# Patient Record
Sex: FEMALE | Employment: UNEMPLOYED | URBAN - METROPOLITAN AREA
[De-identification: names, ages, dates, MRNs, and addresses within clinical notes are randomized per-mention and may not be internally consistent; named-entity substitution may affect disease eponyms.]

---

## 2018-05-10 ENCOUNTER — DOCUMENTATION ONLY (OUTPATIENT)
Dept: SLEEP MEDICINE | Age: 63
End: 2018-05-10

## 2018-07-25 ENCOUNTER — OFFICE VISIT (OUTPATIENT)
Dept: SLEEP MEDICINE | Age: 63
End: 2018-07-25

## 2018-07-25 VITALS
OXYGEN SATURATION: 95 % | HEIGHT: 62 IN | SYSTOLIC BLOOD PRESSURE: 131 MMHG | BODY MASS INDEX: 28.52 KG/M2 | HEART RATE: 77 BPM | DIASTOLIC BLOOD PRESSURE: 74 MMHG | WEIGHT: 155 LBS

## 2018-07-25 DIAGNOSIS — G47.33 OSA (OBSTRUCTIVE SLEEP APNEA): Primary | ICD-10-CM

## 2018-09-04 ENCOUNTER — TELEPHONE (OUTPATIENT)
Dept: SLEEP MEDICINE | Age: 63
End: 2018-09-04

## 2018-09-04 DIAGNOSIS — G47.33 OBSTRUCTIVE SLEEP APNEA (ADULT) (PEDIATRIC): Primary | ICD-10-CM

## 2018-09-04 NOTE — PROGRESS NOTES
[address] [facility] [place]. [place], [address]  Tel.  [phone]  Fax. [address] [facility] 60  [place], [address] [facility]  Tel.  [phone]  Fax. [phone] [address] [facility] Carolyn Gomez [address]  Tel.  [phone]  Fax. [phone]       Chief Complaint       Chief Complaint   Patient presents with    Sleep Problem     NP ref Dr Lamin Crystal has SALO could not wear the device needs options       HPI      Jaden Escobedo is a  58y.o. year old female seen for evaluation of a sleep disorder  . 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 The patient reports [facility] and diagnosed with sleep apnea. Copies of that evaluation are not available. She was started on CPAP which she reports she was unable to tolerate. She reported difficulty in breathing using the CPAP mask. Specific mass type was not reported. She reported dry mouth, difficulties with sleep maintenance. Gary Sleepiness Score: 5       No Known Allergies    Current Outpatient Prescriptions   Medication Sig Dispense Refill    amLODIPine (NORVASC) 5 mg tablet Take 5 mg by mouth daily.  aspirin delayed-release 81 mg tablet Take  by mouth daily.  cholecalciferol, vitamin D3, (VITAMIN D3) 2,000 unit tab Take  by mouth.  biotin 2,500 mcg tab Take 500 Units/day by mouth two (2) times a day.  oxyCODONE-acetaminophen (PERCOCET) 5-325 mg per tablet 1 to 2 tablets every 4 hours as needed for pain 30 Tab 0    WHEAT DEXTRIN/L. ACID/ASPARTAME (FIBER WITH PROBIOTIC PO) Take 1 Tab by mouth daily.  multivitamin (ONE A DAY) tablet Take 1 Tab by mouth daily. She  has a past medical history of Blindness; Blurred vision; Constipation; Hair loss; and Hypertension. She  has a past surgical history that includes hx other surgical (2009) and hx other surgical (1985).     She family history includes Cancer in her mother; Hypertension in her mother and sister. She  reports that she has never smoked. She has never used smokeless tobacco. She reports that she does not drink alcohol or use illicit drugs. Review of Systems:  Review of Systems   Constitutional: Negative for chills and fever. HENT: Negative for hearing loss and tinnitus. Eyes: Positive for blurred vision. Negative for double vision. Respiratory: Negative for cough and wheezing. Cardiovascular: Positive for palpitations and leg swelling. Negative for chest pain. Gastrointestinal: Positive for abdominal pain. Negative for heartburn. Musculoskeletal: Positive for joint pain. Skin: Negative for itching and rash. Neurological: Positive for dizziness, tingling and tremors. Negative for headaches. Psychiatric/Behavioral: Positive for hallucinations and memory loss. Objective:     Visit Vitals    /74    Pulse 77    Ht 5' 2\" (1.575 m)    Wt 155 lb (70.3 kg)    SpO2 95%    BMI 28.35 kg/m2     Body mass index is 28.35 kg/(m^2). General:   Follows commands via    Eyes:  Pupils equal and reactive, no nystagmus,  flat disk margins. Normal A/V ratio   Oropharynx:   Mallampati score I, tongue normal   Tonsils:      Neck:   No carotid bruits; Chest/Lungs:  Clear on auscultation    CVS:  Normal rate, regular rhythm   Skin:  Warm to touch; no obvious rashes   Neuro:  Speech fluent, face symmetrical, tongue movement normal   Psych:  Normal affect,  normal countenance        Assessment:       ICD-10-CM ICD-9-CM    1. SALO (obstructive sleep apnea) G47.33 327.23      History of sleep apnea. Her previous provider's office will be contacted. HSAT will be obtained if prior records are unavailable. Plan:     No orders of the defined types were placed in this encounter. * Patient has a history and examination consistent with the diagnosis of sleep apnea.         Instructions:  o Do not engage in activities requiring a normal degree of alertness if fatigue is present. o The patient understands that untreated or undertreated sleep apnea could impair judgement and the ability to function normally during the day.  o Call or return if symptoms worsen or persist.          Lo Hodges MD, [facility]  Electronically signed 09/04/18       This note was created using voice recognition software. Despite editing, there may be syntax errors. This note will not be viewable in [address]

## 2018-09-04 NOTE — PATIENT INSTRUCTIONS
Sleep Apnea: Care Instructions  Your Care Instructions    Sleep apnea means that you frequently stop breathing for 10 seconds or longer during sleep. It can be mild to severe, based on the number of times an hour that you stop breathing or have slowed breathing. Blocked or narrowed airways in your nose, mouth, or throat can cause sleep apnea. Your airway can become blocked when your throat muscles and tongue relax during sleep. You can treat sleep apnea at home by making lifestyle changes. You also can use a CPAP breathing machine that keeps tissues in the throat from blocking your airway. Or your doctor may suggest that you use a breathing device while you sleep. It helps keep your airway open. This could be a device that you put in your mouth. Other examples include strips or disks that you use on your nose. In some cases, surgery may be needed to remove enlarged tissues in the throat. Follow-up care is a key part of your treatment and safety. Be sure to make and go to all appointments, and call your doctor if you are having problems. It's also a good idea to know your test results and keep a list of the medicines you take. How can you care for yourself at home? · Lose weight, if needed. It may reduce the number of times you stop breathing or have slowed breathing. · Sleep on your side. It may stop mild apnea. If you tend to roll onto your back, sew a pocket in the back of your pajama top. Put a tennis ball into the pocket, and stitch the pocket shut. This will help keep you from sleeping on your back. · Avoid alcohol and medicines such as sleeping pills and sedatives before bed. · Do not smoke. Smoking can make sleep apnea worse. If you need help quitting, talk to your doctor about stop-smoking programs and medicines. These can increase your chances of quitting for good. · Prop up the head of your bed 4 to 6 inches by putting bricks under the legs of the bed.   · Treat breathing problems, such as a stuffy nose, caused by a cold or allergies. · Try a continuous positive airway pressure (CPAP) breathing machine if your doctor recommends it. The machine keeps your airway open when you sleep. · If CPAP does not work for you, ask your doctor if you can try other breathing machines. A bilevel positive airway pressure machine uses one type of air pressure for breathing in and another type for breathing out. Another device raises or lowers air pressure as needed while you breathe. · Talk to your doctor if:  ¨ Your nose feels dry or bleeds when you use one of these machines. You may need to increase moisture in the air. A humidifier may help. ¨ Your nose is runny or stuffy from using a breathing machine. Decongestants or a corticosteroid nasal spray may help. ¨ You are sleepy during the day and it gets in the way of the normal things you do. Do not drive when you are drowsy. When should you call for help? Watch closely for changes in your health, and be sure to contact your doctor if:    · You still have sleep apnea even though you have made lifestyle changes.     · You are thinking of trying a device such as CPAP.     · You are having problems using a CPAP or similar machine. Where can you learn more? Go to http://nettie-marshall.info/. Enter H863 in the search box to learn more about \"Sleep Apnea: Care Instructions. \"  Current as of: December 6, 2017  Content Version: 11.7  © 7906-1741 [employer] Care instructions adapted under license by [employer] (which disclaims liability or warranty for this information). If you have questions about a medical condition or this instruction, always ask your healthcare professional. [place] [address] any warranty or liability for your use of this information.

## 2018-11-30 NOTE — PERIOP NOTES
[address]  Endoscopy Preprocedure Instructions      1. On the day of your surgery, please report to registration located on the 2nd floor of the  [facility]. yes    2. You must have a responsible adult to drive you to the hospital, stay at the hospital during your procedure and drive you home. If they leave your procedure will not be started (no exceptions). yes    3. Do not have anything to eat or drink (including water, gum, mints, coffee, and juice) after midnight. This does not apply to the medications you were instructed to take by your physician. yesIf you are currently taking Plavix, Coumadin, Aspirin, or other blood-thinning agents, contact your physician for special instructions. not applicable    4. If you are having a procedure that requires bowel prep: We recommend that you have only clear liquids the day before your procedure and begin your bowel prep by 5:00 pm.  You may continue to drink clear liquids until midnight. If for any reason you are not able to complete your prep please notify your physician immediately. yes    5. Have a list of all current medications, including vitamins, herbal supplements and any other over the counter medications. yes    6. If you wear glasses, contacts, dentures and/or hearing aids, they may be removed prior to procedure, please bring a case to store them in. yes    7. You should understand that if you do not follow these instructions your procedure may be cancelled. If your physical condition changes (I.e. fever, cold or flu) please contact your doctor as soon as possible. 8. It is important that you be on time.   If for any reason you are unable to keep your appointment please call [phone] the day of or your physicians office prior to your scheduled procedure

## 2018-12-05 ENCOUNTER — ANESTHESIA EVENT (OUTPATIENT)
Dept: ENDOSCOPY | Age: 63
End: 2018-12-05
Payer: MEDICAID

## 2018-12-05 ENCOUNTER — HOSPITAL ENCOUNTER (OUTPATIENT)
Age: 63
Setting detail: OUTPATIENT SURGERY
Discharge: HOME | End: 2018-12-05
Attending: INTERNAL MEDICINE | Admitting: INTERNAL MEDICINE
Payer: MEDICAID

## 2018-12-05 ENCOUNTER — ANESTHESIA (OUTPATIENT)
Dept: ENDOSCOPY | Age: 63
End: 2018-12-05
Payer: MEDICAID

## 2018-12-05 VITALS
HEIGHT: 62 IN | SYSTOLIC BLOOD PRESSURE: 120 MMHG | TEMPERATURE: 98.3 F | BODY MASS INDEX: 28.52 KG/M2 | OXYGEN SATURATION: 100 % | DIASTOLIC BLOOD PRESSURE: 48 MMHG | HEART RATE: 58 BPM | RESPIRATION RATE: 17 BRPM | WEIGHT: 155 LBS

## 2018-12-05 PROCEDURE — 74011250636 HC RX REV CODE- 250/636

## 2018-12-05 PROCEDURE — 76040000019: Performed by: INTERNAL MEDICINE

## 2018-12-05 PROCEDURE — 88305 TISSUE EXAM BY PATHOLOGIST: CPT

## 2018-12-05 PROCEDURE — 77030013992 HC SNR POLYP ENDOSC BSC -B: Performed by: INTERNAL MEDICINE

## 2018-12-05 PROCEDURE — 74011250636 HC RX REV CODE- 250/636: Performed by: INTERNAL MEDICINE

## 2018-12-05 PROCEDURE — 74011000258 HC RX REV CODE- 258

## 2018-12-05 PROCEDURE — 76060000031 HC ANESTHESIA FIRST 0.5 HR: Performed by: INTERNAL MEDICINE

## 2018-12-05 RX ORDER — DEXTROMETHORPHAN/PSEUDOEPHED 2.5-7.5/.8
1.2 DROPS ORAL
Status: DISCONTINUED | OUTPATIENT
Start: 2018-12-05 | End: 2018-12-05 | Stop reason: HOSPADM

## 2018-12-05 RX ORDER — FLUMAZENIL 0.1 MG/ML
0.2 INJECTION INTRAVENOUS
Status: DISCONTINUED | OUTPATIENT
Start: 2018-12-05 | End: 2018-12-05 | Stop reason: HOSPADM

## 2018-12-05 RX ORDER — MIDAZOLAM HYDROCHLORIDE 1 MG/ML
.25-5 INJECTION, SOLUTION INTRAMUSCULAR; INTRAVENOUS
Status: DISCONTINUED | OUTPATIENT
Start: 2018-12-05 | End: 2018-12-05 | Stop reason: HOSPADM

## 2018-12-05 RX ORDER — SODIUM CHLORIDE 9 MG/ML
50 INJECTION, SOLUTION INTRAVENOUS CONTINUOUS
Status: DISCONTINUED | OUTPATIENT
Start: 2018-12-05 | End: 2018-12-05 | Stop reason: HOSPADM

## 2018-12-05 RX ORDER — LIDOCAINE HYDROCHLORIDE 20 MG/ML
INJECTION, SOLUTION EPIDURAL; INFILTRATION; INTRACAUDAL; PERINEURAL AS NEEDED
Status: DISCONTINUED | OUTPATIENT
Start: 2018-12-05 | End: 2018-12-05 | Stop reason: HOSPADM

## 2018-12-05 RX ORDER — EPINEPHRINE 0.1 MG/ML
1 INJECTION INTRACARDIAC; INTRAVENOUS
Status: DISCONTINUED | OUTPATIENT
Start: 2018-12-05 | End: 2018-12-05 | Stop reason: HOSPADM

## 2018-12-05 RX ORDER — PROPOFOL 10 MG/ML
INJECTION, EMULSION INTRAVENOUS AS NEEDED
Status: DISCONTINUED | OUTPATIENT
Start: 2018-12-05 | End: 2018-12-05 | Stop reason: HOSPADM

## 2018-12-05 RX ORDER — PROPOFOL 10 MG/ML
INJECTION, EMULSION INTRAVENOUS
Status: DISCONTINUED | OUTPATIENT
Start: 2018-12-05 | End: 2018-12-05 | Stop reason: HOSPADM

## 2018-12-05 RX ORDER — ATROPINE SULFATE 0.1 MG/ML
0.5 INJECTION INTRAVENOUS
Status: DISCONTINUED | OUTPATIENT
Start: 2018-12-05 | End: 2018-12-05 | Stop reason: HOSPADM

## 2018-12-05 RX ORDER — NALOXONE HYDROCHLORIDE 0.4 MG/ML
0.4 INJECTION, SOLUTION INTRAMUSCULAR; INTRAVENOUS; SUBCUTANEOUS
Status: DISCONTINUED | OUTPATIENT
Start: 2018-12-05 | End: 2018-12-05 | Stop reason: HOSPADM

## 2018-12-05 RX ADMIN — PROPOFOL 80 MG: 10 INJECTION, EMULSION INTRAVENOUS at 07:32

## 2018-12-05 RX ADMIN — SODIUM CHLORIDE 50 ML/HR: 900 INJECTION, SOLUTION INTRAVENOUS at 07:26

## 2018-12-05 RX ADMIN — PROPOFOL 140 MCG/KG/MIN: 10 INJECTION, EMULSION INTRAVENOUS at 07:32

## 2018-12-05 RX ADMIN — LIDOCAINE HYDROCHLORIDE 40 MG: 20 INJECTION, SOLUTION EPIDURAL; INFILTRATION; INTRACAUDAL; PERINEURAL at 07:32

## 2018-12-05 NOTE — PROCEDURES
[address] [facility] [address]  [address] [facility], [address] [facility]  [phone]                   Colonoscopy Operative Report      Indications:    Constipation     :  Priya Tucker MD    Referring Provider: Rodo Perez MD    Sedation:  MAC anesthesia Propofol    Procedure Details:  After informed consent was obtained with all risks and benefits of procedure explained and preoperative exam completed, the patient was taken to the endoscopy suite and placed in the left lateral decubitus position. Upon sequential sedation as per above, a digital rectal exam was performed  And was normal.  The Olympus videocolonoscope  was inserted in the rectum and carefully advanced to the cecum, which was identified by the ileocecal valve and appendiceal orifice, terminal ileum. The quality of preparation was good. The colonoscope was slowly withdrawn with careful evaluation between folds. Retroflexion in the rectum was performed and was normal..     Findings:   Rectum: normal  Sigmoid: normal  Descending Colon: 1  Sessile polyp(s), the largest 6 mm in size;  Transverse Colon: normal  Ascending Colon: normal  Cecum: normal  Terminal Ileum: normal    Interventions:  1 complete polypectomy were performed using cold snare  and the polyps were  retrieved    Specimen Removed:  specimen #1, 7 mm in size, located in the descending colon removed by cold snare and retrieved for pathology    Complications: None. EBL:  None. Impression: Colon polyp (removed)    Recommendations:   -Await pathology. -If adenoma is present, repeat colonoscopy in 5 years.  -High fiber diet.  -Constipation education  -Follow up with primary care physician.     -Resume normal medication(s). Discharge Disposition:  Home in the company of a  when able to ambulate.     Priya Tucker MD  12/5/2018  7:50 AM

## 2018-12-05 NOTE — H&P
[address]  [address] [facility], [address] [facility]  [phone]                     History and Physical     NAME: Liliane Marquez   :  1955   MRN:  [id number]     HPI:   58year old female who presents with a complaint of Constipation. Reason for encounter: consultation at the request of Dr. Flores Clayton). The onset of the constipation has been chronic and has been occurring in a persistent pattern for years. The course has been increasing. The frequency of bowel movements has been 3 per week. The stools are hard stools, ribbon-like stools, rabbit pellet stools (scybalous) and sense of incomplete evacuation (>25 percent of time) and  the amount of stool per bowel movement is small .  The symptoms have been associated with abdominal distention and history of abdominal surgery (CHLOÉ and cystocele), while the symptoms have not been associated with abdominal pain, bladder disturbances, bleeding per rectum, depression, family history of colon cancer, hemorrhoids, vomiting or family history of colon polyps. The constipation is characterized as partially controlled (hihg fiber intake). The patient denies the use of fiber supplementation, laxatives or herbal supplements. Previous diagnostic tests have not included colonoscopy. Note for \"Constipation\": pt has h/o cystocele sx 10 yrs ago.      Past Surgical History:   Procedure Laterality Date    HX CATARACT REMOVAL Bilateral     HX COLONOSCOPY      HX HYSTERECTOMY  2009    Not sure if she has her ovaries, patient states    HX OTHER SURGICAL  1985    tubiligation    HX OTHER SURGICAL Bilateral     eyes     Past Medical History:   Diagnosis Date    Adverse effect of anesthesia     During open dental work-local anesthesia- increase in heart rate     Blindness     retina issues- legally blind (Bilat. eyes)    Blurred vision     Constipation     Hair loss     Heart murmur     Hypertension      Social History     Tobacco Use    Smoking status: Never Smoker    Smokeless tobacco: Never Used   Substance Use Topics    Alcohol use: Yes     Alcohol/week: 0.6 oz     Types: 1 Glasses of wine per week     Comment: occ    Drug use: No     No Known Allergies  Family History   Problem Relation Age of Onset    Cancer Mother         uterine    Hypertension Mother     Hypertension Sister      No current facility-administered medications for this encounter. PHYSICAL EXAM:  General: WD, WN. Alert, cooperative, no acute distress    HEENT: NC, Atraumatic. PERRLA, EOMI. Anicteric sclerae. Lungs:  CTA Bilaterally. No Wheezing/Rhonchi/Rales. Heart:  Regular  rhythm,  No murmur, No Rubs, No Gallops  Abdomen: Soft, Non distended, Non tender.  +Bowel sounds, no HSM  Extremities: No c/c/e  Neurologic:  CN 2-12 gi, Alert and oriented X 3. No acute neurological distress   Psych:   Good insight. Not anxious nor agitated. Assessment:   I have reviewed with the patient +/- family alternatives,benefits and risks for the procedure, as well as potential complications(with emphasis on, but not limited to, bleeding, perforation, cardiovascular/cerebrovascular/pulmonary events, reactions to the medications, infection, risk of missing a lesions/a cancer, and the imponderables including death), alternative options, and patient/family voices understanding.       Plan:   · Endoscopic procedure  · Conscious sedation or MAC

## 2018-12-05 NOTE — ANESTHESIA POSTPROCEDURE EVALUATION
Procedure(s): 
COLONOSCOPY 
ENDOSCOPIC POLYPECTOMY. Anesthesia Post Evaluation Patient location during evaluation: PACU Level of consciousness: awake Pain management: adequate Airway patency: patent Anesthetic complications: no 
Cardiovascular status: acceptable Respiratory status: acceptable Hydration status: acceptable Visit Vitals /47 Pulse 60 Temp 36.8 °C (98.3 °F) Resp 16 Ht 5' 2\" (1.575 m) Wt 70.3 kg (155 lb) SpO2 99% Breastfeeding? No  
BMI 28.35 kg/m²

## 2018-12-05 NOTE — ROUTINE PROCESS
Nida Downsmor  1955  [id number]    Situation:  Verbal report received from: Juanita Plummer RN  Procedure: Procedure(s):  COLONOSCOPY  ENDOSCOPIC POLYPECTOMY    Background:    Preoperative diagnosis: CONSTIPATION  Postoperative diagnosis: Descending colon polyp    :  Dr. Marquis Jurado  Assistant(s): Endoscopy RN-1: Juanita Ludwig RN; Jessica Santacruz RN    Specimens:   ID Type Source Tests Collected by Time Destination   1 : Descending colon polyp Preservative   Augustina Turner MD 12/5/2018 0117 Pathology     H. Pylori  no    Assessment:  Intra-procedure medications     Anesthesia gave intra-procedure sedation and medications, see anesthesia flow sheet yes    Intravenous fluids: NS@ KVO     Vital signs stable     Abdominal assessment: round and soft     Recommendation:  Discharge patient per MD order  Family or Friend   Permission to share finding with family or friend yes    Endoscopy discharge instructions have been reviewed and given to patient and son. The patient and son verbalized understanding and acceptance of instructions.

## 2018-12-05 NOTE — PERIOP NOTES
Procedure being performed under MAC; Kodak Padgett., CRNA at bedside monitoring patient at [facility]. See anesthesia notes. Endoscope was pre-cleaned at bedside immediately following procedure by Ry Newby RN Tech. at [phone]. Care of patient assumed from the anesthesia provider at . Patient tolerated procedure well. Abdomen remains soft and non tender post procedure, no complaints or indication of discomfort noted at this time. See anesthesia note. Patient transferred to Endoscopy Recovery and report given to recovery nurse Chantelle Burris. recovery room MIRELLA.

## 2018-12-05 NOTE — ANESTHESIA PREPROCEDURE EVALUATION
Anesthetic History No history of anesthetic complications Review of Systems / Medical History Patient summary reviewed, nursing notes reviewed and pertinent labs reviewed Pulmonary Within defined limits Neuro/Psych Within defined limits Cardiovascular Hypertension: well controlled Exercise tolerance: >4 METS 
  
GI/Hepatic/Renal 
Within defined limits Endo/Other Within defined limits Other Findings Physical Exam 
 
Airway Mallampati: II 
TM Distance: 4 - 6 cm Neck ROM: normal range of motion Mouth opening: Normal 
 
 Cardiovascular Regular rate and rhythm,  S1 and S2 normal,  no murmur, click, rub, or gallop Rhythm: regular Rate: normal 
 
 
 
 Dental 
No notable dental hx Pulmonary Breath sounds clear to auscultation Abdominal 
GI exam deferred Other Findings Anesthetic Plan ASA: 2 Anesthesia type: MAC Induction: Intravenous Anesthetic plan and risks discussed with: Patient

## 2018-12-05 NOTE — DISCHARGE INSTRUCTIONS
[address] [facility]  [address], [address] [facility]  [phone]                   Justice   [id number]  1955    COLON DISCHARGE INSTRUCTIONS    DISCOMFORT:  Redness at IV site- apply warm compress to area; if redness or soreness persist- contact your physician  There may be a slight amount of blood passed from the rectum  Gaseous discomfort- walking, belching will help relieve any discomfort  You may not operate a vehicle for 12 hours  You may not  engage in an occupation involving machinery or appliances for rest of today  You may not  drink alcoholic beverages for at least 12 hours  Avoid making any critical decisions for at least 24 hour    DIET:   High fiber diet. - however -  remember your colon is empty and a heavy meal will produce gas. Avoid these foods:  vegetables, fried / greasy foods, carbonated drinks for today     ACTIVITY:  It is recommended that you spend the remainder of the day resting -  avoid any strenuous activity. CALL M.D. ANY SIGN OF:   Increasing pain, nausea, vomiting  Abdominal distension (swelling)  New increased bleeding (oral or rectal)  Fever (chills)  Pain in chest area  Bloody discharge from nose or mouth  Shortness of breath    You may resume your medications    Post procedure diagnosis:  Descending colon polyp    Follow-up Instructions:    If a specimen was collected, you will receive a letter with the result by mail within two  weeks. Depending on the result this letter will specify your follow up colonoscopy date.       Please call us for any questions or concerns                     Justice Alejandro  [id number]  1955        DISCHARGE SUMMARY from Nurse    The following personal items collected during your admission are returned to you:   Dental Appliance: Dental Appliances: None  Vision: Visual Aid: None  Hearing Aid:    Jewelry:    Clothing:    Other Valuables:    Valuables sent to safe:            Constipation: Care Instructions  Your Care Instructions    Constipation means that you have a hard time passing stools (bowel movements). People pass stools from 3 times a day to once every 3 days. What is normal for you may be different. Constipation may occur with pain in the rectum and cramping. The pain may get worse when you try to pass stools. Sometimes there are small amounts of bright red blood on toilet paper or the surface of stools. This is because of enlarged veins near the rectum (hemorrhoids). A few changes in your diet and lifestyle may help you avoid ongoing constipation. Your doctor may also prescribe medicine to help loosen your stool. Some medicines can cause constipation. These include pain medicines and antidepressants. Tell your doctor about all the medicines you take. Your doctor may want to make a medicine change to ease your symptoms. Follow-up care is a key part of your treatment and safety. Be sure to make and go to all appointments, and call your doctor if you are having problems. It's also a good idea to know your test results and keep a list of the medicines you take. How can you care for yourself at home? · Drink plenty of fluids, enough so that your urine is light yellow or clear like water. If you have kidney, heart, or liver disease and have to limit fluids, talk with your doctor before you increase the amount of fluids you drink. · Include high-fiber foods in your diet each day. These include fruits, vegetables, beans, and whole grains. · Get at least 30 minutes of exercise on most days of the week. Walking is a good choice. You also may want to do other activities, such as running, swimming, cycling, or playing tennis or team sports. · Take a fiber supplement, such as Citrucel or Metamucil, every day. Read and follow all instructions on the label. · Schedule time each day for a bowel movement. A daily routine may help. Take your time having your bowel movement.   · Support your feet with a small step stool when you sit on the toilet. This helps flex your hips and places your pelvis in a squatting position. · Your doctor may recommend an over-the-counter laxative to relieve your constipation. Examples are Milk of Magnesia and MiraLax. Read and follow all instructions on the label. Do not use laxatives on a long-term basis. When should you call for help? Call your doctor now or seek immediate medical care if:    · You have new or worse belly pain.     · You have new or worse nausea or vomiting.     · You have blood in your stools.    Watch closely for changes in your health, and be sure to contact your doctor if:    · Your constipation is getting worse.     · You do not get better as expected. Where can you learn more? Go to http://nettie-marshall.info/. Enter 21 [phone] in the search box to learn more about \"Constipation: Care Instructions. \"  Current as of: November 20, 2017  Content Version: 11.8  © 1768-4491 [employer]. Care instructions adapted under license by [employer] (which disclaims liability or warranty for this information). If you have questions about a medical condition or this instruction, always ask your healthcare professional. [place] [address] any warranty or liability for your use of this information. Dieta charlotte en fibra: Instrucciones de cuidado - [ High-Fiber Diet: Care Instructions ]  Instrucciones de cuidado    [facility] dieta con alto contenido de fibra puede ayudarle a aliviar el estreñimiento y a sentirse menos abotagado. Pickens médico y pickens dietista le ayudarán a crear un plan de alimentación con alto contenido de fibra basándose en radha necesidades personales. El plan incluirá las cosas que le gusta comer. También se asegurarán de que obtenga 30 gramos de fibra al día. Antes de hacer cambios en la [facility] come, asegúrese de hablar con pickens médico o pickens dietista.   [place] de seguimiento es caden parte clave de pickens tratamiento y seguridad. Asegúrese de hacer y acudir a todas las citas, y llame a pickens médico si está teniendo problemas. También es caden buena idea saber los resultados de radha exámenes y mantener caedn lista de los medicamentos que rosana. ¿Cómo puede cuidarse en el hogar? · Usted puede aumentar la cantidad de fibra que ingiere si come más de ciertos alimentos. Estos alimentos incluyen:  ? Panes y cereales integrales. ? Frutas, rylan peras, manzanas y duraznos (melocotones). Coma la piel, la cáscara y las semillas si puede. ? Verduras, rylan brócoli, repollo, espinacas, zanahorias, espárragos y calabaza. ? Verduras con almidón. Estas incluyen las minesh con cáscara, los frijoles rojos y los frijoles de lima. · [place] un suplemento de [employer] días si pickens médico lo recomienda. Los ejemplos son Benefiber, Citrucel, FiberCon y Metamucil. Pregunte a pickens médico qué cantidad debe mili. · Nell abundantes líquidos, los suficientes rylan para que pickens orina sea de color amarillo mimi o transparente rylan el agua. Si tiene [employer], del corazón o del hígado y tiene que Krista's líquidos, hable con pickens médico antes de aumentar pickens consumo. · Juan C algo de ejercicio todos los young. El ejercicio ayuda a que las heces se desplacen por el colon. También ayuda a prevenir el estreñimiento. · Lleve un diario de alimentos. Trate de observar y anotar qué alimentos le provocan gas, dolor u otros síntomas. De mark modo, puede evitar esos alimentos. ¿Dónde puede encontrar más información en inglés? Thania Sang a http://nettie-marshall.info/. Concepcion Peck B337 en la búsqueda para aprender más acerca de \"Dieta charlotte en fibra: Instrucciones de cuidado - [ High-Fiber Diet: Care Instructions ]. \"  Revisado: [address], 2018  Versión del contenido: 11.8  © 9039-8531 Healthwise, [employer].  Las instrucciones de cuidado fueron adaptadas bajo licencia por [employer] Help Connections (which disclaims liability or warranty for this information). Si usted tiene [place] afección médica o sobre estas instrucciones, siempre pregunte a pickens profesional de hilaria. [facility] niega toda garantía o responsabilidad por pickens uso de esta información.

## 2020-01-21 ENCOUNTER — OFFICE VISIT (OUTPATIENT)
Dept: NEUROLOGY | Age: 65
End: 2020-01-21

## 2020-01-21 VITALS
HEART RATE: 71 BPM | OXYGEN SATURATION: 98 % | WEIGHT: 153.5 LBS | DIASTOLIC BLOOD PRESSURE: 80 MMHG | SYSTOLIC BLOOD PRESSURE: 118 MMHG | RESPIRATION RATE: 20 BRPM | HEIGHT: 62 IN | BODY MASS INDEX: 28.25 KG/M2

## 2020-01-21 DIAGNOSIS — G25.0 BENIGN ESSENTIAL TREMOR: Primary | ICD-10-CM

## 2020-01-21 RX ORDER — PRIMIDONE 50 MG/1
TABLET ORAL
Qty: 90 TAB | Refills: 1 | Status: SHIPPED | OUTPATIENT
Start: 2020-01-21 | End: 2020-09-23 | Stop reason: SDUPTHER

## 2020-01-21 NOTE — LETTER
1/21/20 Patient: Cata Grove YOB: 1955 Date of Visit: 1/21/2020 Nahid Oconnor MD 
[address] [place] [address] VIA Facsimile: [phone] Dear Nahid Oconnor MD, Thank you for referring Ms. Cata Grove to [facility] for evaluation. My notes for this consultation are attached. If you have questions, please do not hesitate to call me. I look forward to following your patient along with you. Sincerely, Aleyda Flores MD

## 2020-01-21 NOTE — PATIENT INSTRUCTIONS
Temblor esencial williams: Instrucciones de cuidado - [ Benign Essential Tremor: Care Instructions ] Instrucciones de cuidado Temblor esencial williams es un término médico para un temblor que usted no puede controlar. [place] vez le tiemblen la mano o los dedos cuando levanta caden taza o señala algo. O quizás le tiemble la voz al Eloise [employer] Neeraj. Chayito tipo de temblor no es dañino. Pickens causa no es un ataque cerebral ni la enfermedad de Parkinson. Ciertas cosas pueden tener un efecto en cuánto tiembla. Por ejemplo, beber o comer algo con cafeína puede empeorar los temblores de forma temporal. Algunos medicamentos también pueden aumentar los temblores. Estos incluyen los antidepresivos y demasiados sustitutos de la hormona tiroidea. Hable con pickens médico si pacheco que alguno de radha medicamentos empeora radha temblores. Si los temblores lo avergüenzan, existen algunas cosas que usted puede hacer para reducirlos o hacer que keily menos evidentes. [place] incluye mili medicamentos. La atención de seguimiento es caden parte clave de pickens tratamiento y seguridad. Asegúrese de hacer y acudir a todas las citas, y llame a pickens médico si está teniendo problemas. También es caden buena idea saber los resultados de radha exámenes y mantener caden lista de los medicamentos que rosana. Cómo puede cuidarse en el hogar? · [place] los medicamentos exactamente según las indicaciones. Llame a pickens médico si pacheco estar teniendo un problema con pickens medicamento. Algunos medicamentos que ayudan a [employer] temblores se deben mili todos los días, incluso si no tiene temblores. Recibirá [employer] medicamentos específicos recetados por pickens médico. 
· Descanse lo suficiente. · Siga caden Lavona Damme y saludable. · Intente reducir el estrés. El ejercicio y los masajes regulares le podrían ayudar. · Limite el alcohol. Beber en exceso puede [employer] temblores.  
· Evite las bebidas o alimentos con cafeína si hacen que empeoren radha temblores. Se incluyen el té, las bebidas de cola, el café y el chocolate. · Lleve puesto un brazalete o un reloj pesado. [place] añade algo de [employer]. El peso adicional podría reducir los temblores. · Nell de tazas o vasos que solo estén a medio llenar. Puede convenirle tratar de beber con caden pajita (popote). Cuándo debe pedir ayuda? Preste especial atención a los cambios en pickens hilaria y asegúrese de comunicarse con pickens médico si: 
  · Nota que los temblores están empeorando.  
  · No puede hacer las actividades diarias por los temblores.  
  · Está jeferson y avergonzado por radha temblores. Dónde puede encontrar más información en inglés? Florence Brooks a http://nettie-marshall.info/. Escriba B746 en la búsqueda para aprender más acerca de \"Temblor esencial williams: Instrucciones de cuidado - [ Benign Essential Tremor: Care Instructions ]. \" 
Revisado: 28 marzo, 2019 Versión del contenido: 12.2 © 9566-6969 Healthwise, Incorporated. Las instrucciones de cuidado fueron adaptadas bajo licencia por [employer] Help Connections (which disclaims liability or warranty for this information). Si usted tiene [place] afección médica o sobre estas instrucciones, siempre pregunte a pickens profesional de hilaria. [place], [employer] niega toda garantía o responsabilidad por pickens uso de esta información.

## 2020-01-21 NOTE — PROGRESS NOTES
Hand tremors have gotten worse since she was last seen by the other neurologist but there was never a follow up there it has been over a year since she last saw them. She stated she has the tremors are in her hands but she does feel it go all the way up her arms (both of them).      No medications have been tried the only testing MRI of the brain was done   They determined everything was normal and they told her there was no follow up needed   MRI was at least a year ago

## 2020-01-21 NOTE — PROGRESS NOTES
NEUROLOGY CLINIC NOTE    Patient ID:  Chase Fallon  [id number]  23 y.o.  1955    Date of Consultation:  January 21, 2020    Referring Physician: Dr. Daniela Xie    Reason for Consultation:  Hand tremors    Chief Complaint   Patient presents with    New Patient     hand tremors        History of Present Illness: There are no active problems to display for this patient. Past Medical History:   Diagnosis Date    Adverse effect of anesthesia     During open dental work-local anesthesia- increase in heart rate     Blindness     retina issues- legally blind (Bilat. eyes)    Blurred vision     Constipation     Hair loss     Heart murmur     Hypertension       Past Surgical History:   Procedure Laterality Date    COLONOSCOPY N/A 12/5/2018    [facility] at [facility] HX CATARACT REMOVAL Bilateral     HX COLONOSCOPY      HX HYSTERECTOMY  2009    Not sure if she has her ovaries, patient states    HX [place]    tubiligation    HX OTHER SURGICAL Bilateral     eyes      Prior to Admission medications    Medication Sig Start Date End Date Taking? Authorizing Provider   amLODIPine (NORVASC) 5 mg tablet Take 5 mg by mouth daily. Yes Provider, Historical   cholecalciferol, vitamin D3, (VITAMIN D3) 2,000 unit tab Take  by mouth. Yes Provider, Historical   multivitamin (ONE A DAY) tablet Take 1 Tab by mouth daily. Yes Provider, Historical   WHEAT DEXTRIN/L. ACID/ASPARTAME (FIBER WITH PROBIOTIC PO) Take 1 Tab by mouth daily. Provider, Historical   aspirin delayed-release 81 mg tablet Take  by mouth daily. Provider, Historical   biotin 2,500 mcg tab Take 500 Units/day by mouth two (2) times a day. Provider, Historical     No Known Allergies   Social History     Tobacco Use    Smoking status: Never Smoker    Smokeless tobacco: Never Used   Substance Use Topics    Alcohol use:  Yes     Alcohol/week: 1.0 standard drinks     Types: 1 Glasses of wine per week     Comment: occ      Family History   Problem Relation Age of Onset    Cancer Mother         uterine    Hypertension Mother     Hypertension Sister         Subjective:      Sarah Garnett is a 59 y.o. RH female with history of bilateral retinal issues causing blindness, HTN, hyperlipidemia and SALO who was referred here by Dr. Nohemy Rossi for further evaluation of her tremors. Patient was seen by her PCP 12/23/2019. Note mentions patient having issues with hypertension and hyperlipidemia. Also has issues with retinal dystrophy. Patient wanted to see another neurologist in relation to her worsening tremors. Seen by Dr. Devin Mckenzie last June 2018 and there was a suggestion of parkinsonism. Patient had a brain MRI done and has had no follow-up. Patient was referred here for another opinion. 12/18/2019: CBC, CMP, hepatitis panel, HIV, vitamin D, TSH were normal. Lipid panel revealed slightly elevated cholesterol and LDL. Per patient condition started about 2 years PTC. Gradual onset. Main involving her left fingers. More prominent when she wakes up in the morning. Shakes when she is holding on to something with the left hand. No issues with the right hand. Drops things with the left hand. Tremors seems to be spreading up her whole left arm. Some tremors at rest but mainly with use. Went to be seen at [facility] more than 1 year PTC. Mentions having a brain MRI done and reported to be unremarkable. No follow up was made. Denies any weakness. No issues with walking or day to day activity except for limitation due to her vision issues. Outside reports reviewed: office notes.     Review of Systems:    A comprehensive review of systems was performed:   Constitutional: positive for none  Eyes: positive for none  Ears, nose, mouth, throat, and face: positive for snoring  Respiratory: positive for none  Cardiovascular: positive for skipped beats  Gastrointestinal: positive for none  Genitourinary: positive for none  Integument/breast: positive for none  Hematologic/lymphatic: positive for none  Musculoskeletal: positive for none  Neurological: positive for memory loss, tremors  Behavioral/Psych: positive for none  Endocrine: positive for none  Allergic/Immunologic: positive for none      Objective:     Visit Vitals  /80   Pulse 71   Resp 20   Ht 5' 2\" (1.575 m)   Wt 69.6 kg (153 lb 8 oz)   SpO2 98%   BMI 28.08 kg/m²       PHYSICAL EXAM:    General Appearance: Alert, patient appears stated age. General:  Overweight, patient in no apparent distress. Head/Face: The head is normocephalic and atraumatic. Eyes: Conjunctivae appear normal. Sclera appear normal and non-icteric. Nose (and Sinus):   No abnormality of the nose or sinuses is noted. Oral:   Throat is clear. Lymphatics:  No lymphadenopathy in the neck/head. Neck and Thyroid:   No bruits noted in the neck. Respiratory:  Lungs clear to auscultation. Cardiovascular:  Palpation and auscultation: regular rate and rhythm. Extremity: No joint swelling, erythema or pedal edema. NEUROLOGICAL EXAM:    Appearance: The patient is overweight, provides a coherent history and is in no acute distress. Mental Status: Oriented to time, place and person. Fluent, no aphasia or dysarthria. Mood and affect appropriate. Cranial Nerves:   Legally blind OU. SIRENA, EOM's full, no nystagmus, no ptosis. Facial sensation is normal. Corneal reflexes are intact. Facial movement is symmetric. Hearing is normal bilaterally. Palate is midline with normal elevation. Sternocleidomastoid and trapezius muscles are normal. Tongue is midline. Motor:  5/5 strength in upper and lower proximal and distal muscles. Normal bulk and tone. No fasciculations. No pronator drift. Reflexes:   Deep tendon reflexes 2+/4 and symmetrical. Downgoing toes. Sensory:   Normal to cold, pinprick and vibration. Gait:  Normal gait. No Romberg.  Can do tandem walking. Tremor:   Very mild intentional and postural L>R UE tremor noted. No resting pill rolling tremors seen. Cerebellar:  Intact FTN/QUITA/HTS. No bradykinesia. Neurovascular:  Normal heart sounds and regular rhythm, peripheral pulses intact, and no carotid bruits. Labs Reviewed      Assessment:   Essential tremors - worsening    Plan:   Neurological examination reveals baseline blindness in both eyes and very mild intentional and postural left greater than right upper extremity tremors. No findings typically seen in Parkinson's disease. Suspect issues more consistent with benign essential tremors. I need to obtain records from her previous neurologist as well as the brain MRI that was done. Discussed trial of medication versus monitoring. Patient opted to try medications. Trial of primidone 50 mg at bedtime initially and up to 50 mg 3 times daily if necessary. Prescriptions provided. Other options include beta-blockers, gabapentin, Topamax and benzodiazepine. Advised patient to do routine structured exercises. All questions and concerns were answered. Visit lasted 60 minutes. Given the 50% was spent reviewing her medical records as summarized above including recent laboratory work-up, discussion about her condition, potential etiology, prognosis, need to obtain medical records especially neuroimaging results, treatment options, medication    This note was created using voice recognition software. Despite editing, there may be syntax errors.

## 2020-09-23 ENCOUNTER — OFFICE VISIT (OUTPATIENT)
Dept: NEUROLOGY | Age: 65
End: 2020-09-23
Payer: MEDICAID

## 2020-09-23 VITALS
TEMPERATURE: 96.8 F | OXYGEN SATURATION: 98 % | HEART RATE: 66 BPM | RESPIRATION RATE: 20 BRPM | DIASTOLIC BLOOD PRESSURE: 70 MMHG | SYSTOLIC BLOOD PRESSURE: 130 MMHG

## 2020-09-23 DIAGNOSIS — G20 PARKINSONISM, UNSPECIFIED PARKINSONISM TYPE (HCC): ICD-10-CM

## 2020-09-23 DIAGNOSIS — G25.0 BENIGN ESSENTIAL TREMOR: Primary | ICD-10-CM

## 2020-09-23 PROCEDURE — 99214 OFFICE O/P EST MOD 30 MIN: CPT | Performed by: PSYCHIATRY & NEUROLOGY

## 2020-09-23 RX ORDER — PRIMIDONE 50 MG/1
TABLET ORAL
Qty: 90 TAB | Refills: 1 | Status: SHIPPED | OUTPATIENT
Start: 2020-09-23

## 2020-09-23 NOTE — LETTER
9/23/20 Patient: Aashish Munoz YOB: 1955 Date of Visit: 9/23/2020 Jimenez Luke MD 
[address] [place] [id number] VIA Facsimile: [phone] Dear Jimenez Luke MD, Thank you for referring Ms. Aashish Munoz to [facility] for evaluation. My notes for this consultation are attached. If you have questions, please do not hesitate to call me. I look forward to following your patient along with you. Sincerely, Allen Patel MD

## 2020-09-23 NOTE — PROGRESS NOTES
Patient opted to not take the primidone   She never started the medication     Tremors are worse in the left hand and she feels it is going down her body on the left side to her legs   Her hands shake even when at rest

## 2020-09-23 NOTE — PATIENT INSTRUCTIONS
Temblor esencial williams: Instrucciones de cuidado Benign Essential Tremor: Care Instructions Instrucciones de cuidado Temblor esencial williams es un término médico para un temblor que usted no puede controlar. [place] vez le tiemblen la mano o los dedos cuando levanta caden taza o señala algo. O quizás le tiemble la voz al [employer]. Chayito tipo de temblor no es dañino. Pick[place] causa no es un ataque cerebral ni la enfermedad de Parkinson. Ciertas cosas pueden tener un efecto en cuánto tiembla. Por ejemplo, beber o comer algo con cafeína puede empeorar los temblores de forma temporal. Algunos medicamentos también pueden aumentar los temblores. Estos incluyen los antidepresivos y demasiados sustitutos de la hormona tiroidea. Hable con pickens médico si pacheco que alguno de radha medicamentos empeora radha temblores. Si los temblores lo avergüenzan, existen algunas cosas que usted puede hacer para reducirlos o hacer que keily menos evidentes. [place] incluye mili medicamentos. La atención de seguimiento es caden parte clave de pickens tratamiento y seguridad. Asegúrese de hacer y acudir a todas las citas, y llame a pickens médico si está teniendo problemas. También es caden buena idea saber los resultados de radha exámenes y mantener caden lista de los medicamentos que rosana. Cómo puede cuidarse en el hogar? · [place] los medicamentos exactamente según las indicaciones. Llame a pickens médico si pacheco estar teniendo un problema con pickens medicamento. Algunos medicamentos que ayudan a [employer] temblores se deben mili todos los días, incluso si no tiene temblores. Recibirá [employer] medicamentos específicos recetados por pickens médico. 
· Descanse lo suficiente. · Siga caden [place] Dull y saludable. · Intente reducir el estrés. El ejercicio y los masajes regulares le podrían ayudar. · Limite el alcohol. Beber en exceso puede [employer] temblores.  
· Evite las bebidas o alimentos con cafeína si hacen que empeoren radha temblores. Se incluyen el té, las bebidas de cola, el café y el chocolate. · Lleve puesto un brazalete o un reloj pesado. [place] añade algo de [facility]. El peso adicional podría reducir los temblores. · Nell de tazas o vasos que solo estén a medio llenar. Puede convenirle tratar de beber con caden pajita (popote). Cuándo debe pedir ayuda? Preste especial atención a los cambios en pickens hilaria y asegúrese de comunicarse con pickens médico si: 
  · Nota que los temblores están empeorando.  
  · No puede hacer las actividades diarias por los temblores.  
  · Está jeferson y avergonzado por radha temblores. Dónde puede encontrar más información en inglés? Cony Ask a http://nettie-marshall.info/ [place] B746 en la búsqueda para aprender más acerca de \"Temblor esencial williams: Instrucciones de cuidado. \" Revisado: 20 noviembre, 2019               Versión del contenido: 12.6 © 5955-1395 Healthwise, Incorporated. Las instrucciones de cuidado fueron adaptadas bajo licencia por [employer]ctions (which disclaims liability or warranty for this information). Si kosta tiene [place] afección médica o sobre estas instrucciones, siempre pregunte a pickens profesional de hilaria. [employer] niega toda garantía o responsabilidad por pickens uso de esta información.

## 2020-09-23 NOTE — PROGRESS NOTES
NEUROLOGY CLINIC NOTE    Patient ID:  Jesus Donaldson  [id number]  5 y.o.  1955    Date of Visit:  September 23, 2020    Referring Physician: Dr. Noemi Wade    Reason for Visit:  Hand tremors    Chief Complaint   Patient presents with    Follow-up     hand tremors        History of Present Illness: There are no active problems to display for this patient. Past Medical History:   Diagnosis Date    Adverse effect of anesthesia     During open dental work-local anesthesia- increase in heart rate     Blindness     retina issues- legally blind (Bilat. eyes)    Blurred vision     Constipation     Hair loss     Heart murmur     Hypertension       Past Surgical History:   Procedure Laterality Date    COLONOSCOPY N/A 12/5/2018    COLONOSCOPY performed by Nevin Dos Santos MD at [facility] HX CATARACT REMOVAL Bilateral     HX COLONOSCOPY      HX HYSTERECTOMY  2009    Not sure if she has her ovaries, patient states    HX [place]    tubiligation    HX OTHER SURGICAL Bilateral     eyes      Prior to Admission medications    Medication Sig Start Date End Date Taking? Authorizing Provider   amLODIPine (NORVASC) 5 mg tablet Take 5 mg by mouth daily. Yes Provider, Historical   aspirin delayed-release 81 mg tablet Take  by mouth daily. Yes Provider, Historical   cholecalciferol, vitamin D3, (VITAMIN D3) 2,000 unit tab Take  by mouth. Yes Provider, Historical   biotin 2,500 mcg tab Take 500 Units/day by mouth two (2) times a day. Yes Provider, Historical   multivitamin (ONE A DAY) tablet Take 1 Tab by mouth daily. Yes Provider, Historical   primidone (MYSOLINE) 50 mg tablet Take 1 at bedtime x 1 wk; then 1 BID x 1 wk; then 1 TID 1/21/20   Altagracia Chow MD   WHEAT DEXTRIN/L. ACID/ASPARTAME (FIBER WITH PROBIOTIC PO) Take 1 Tab by mouth daily.     Provider, Historical     No Known Allergies   Social History     Tobacco Use    Smoking status: Never Smoker    Smokeless tobacco: Never Used   Substance Use Topics    Alcohol use: Yes     Alcohol/week: 1.0 standard drinks     Types: 1 Glasses of wine per week     Comment: occ      Family History   Problem Relation Age of Onset    Cancer Mother         uterine    Hypertension Mother     Hypertension Sister         Subjective:      Hardeep Santos is a 59 y.o. RH female with history of bilateral retinal issues causing blindness, HTN, hyperlipidemia and SALO who was referred here by Dr. Kristen Bedoya for further evaluation of her tremors and is here for follow up. Patient was seen by her PCP 12/23/2019. Note mentions patient having issues with hypertension and hyperlipidemia. Also has issues with retinal dystrophy. Patient wanted to see another neurologist in relation to her worsening tremors. Seen by Dr. Rocio Vargas last June 2018 and there was a suggestion of parkinsonism. Patient had a brain MRI done and has had no follow-up. Patient was referred here for another opinion. 12/18/2019: CBC, CMP, hepatitis panel, HIV, vitamin D, TSH were normal. Lipid panel revealed slightly elevated cholesterol and LDL. Per patient condition started about 2 years PTC. Gradual onset. Main involving her left fingers. More prominent when she wakes up in the morning. Shakes when she is holding on to something with the left hand. No issues with the right hand. Drops things with the left hand. Tremors seems to be spreading up her whole left arm. Some tremors at rest but mainly with use. Went to be seen [facility] more than 1 year PTC. Mentions having a brain MRI done and reported to be unremarkable. No follow up was made. Denies any weakness. No issues with walking or day to day activity except for limitation due to her vision issues. Since the last visit on 1/21/2020, patient apparently upon reading the potential side effects of primidone decided not to take it. Now she feels her left hand tremors are more intense.   It is more prominent when she uses the left hand and holds onto things. She also feels a sense of weakness and heaviness of the left arm and hand. She also notes spread of tremor almost throughout her body. Patient was thinking that improvement of her sleep with the use of CPAP supplementation for obstructive sleep apnea may offer benefit for her tremors but it did not. Outside reports reviewed: none    Review of Systems:    A comprehensive review of systems was performed:   Constitutional: positive for none  Eyes: positive for none  Ears, nose, mouth, throat, and face: positive for snoring  Respiratory: positive for none  Cardiovascular: positive for skipped beats  Gastrointestinal: positive for none  Genitourinary: positive for none  Integument/breast: positive for none  Hematologic/lymphatic: positive for none  Musculoskeletal: positive for none  Neurological: positive for memory loss, tremors  Behavioral/Psych: positive for none  Endocrine: positive for none  Allergic/Immunologic: positive for none      Objective:     Visit Vitals  /70   Pulse 66   Temp 96.8 °F (36 °C)   Resp 20   SpO2 98%       PHYSICAL EXAM:      NEUROLOGICAL EXAM:    Appearance: The patient is overweight, provides a coherent history and is in no acute distress. Mental Status: Oriented to time, place and person. Fluent, no aphasia or dysarthria. Mood and affect appropriate. Cranial Nerves:   II - XII were intact except legally blind OU. Motor:  5/5 strength in upper and lower proximal and distal muscles. Normal bulk and tone. No fasciculations. No pronator drift. Reflexes:   Deep tendon reflexes were symmetrical.    Sensory:   Normal to cold, pinprick and vibration. Gait:  Normal gait. No Romberg. Tremor:   Very mild postural L>R UE tremor but more prominent intentional tremors LUE noted. Mild resting pill rolling tremor left hand. Cerebellar:  Intact FTN/QUITA/HTS. Mild bradykinesia.        Assessment:   Essential tremors - worsening  Parkinsonism    Plan:   Neurological examination reveals baseline blindness in both eyes and very mild postural left greater than right upper extremity tremors and more prominent intentional LUE tremors. Suspect issues more consistent with benign essential tremors. I need to obtain records from her previous neurologist as well as the brain MRI that was done. Discussed again trial of medication versus monitoring. Patient opted to try medications. Trial of primidone 50 mg at bedtime initially and up to 50 mg 3 times daily if necessary. Prescriptions provided. Other options include beta-blockers, gabapentin, Topamax and benzodiazepine. Advised patient to do routine structured exercises. Patient also referred to PT to optimize level of functioning. Resting pill rolling tremor and bradykinesia with mild masked facies seen on exam. Possible emerging parkinson's disease or parkinsonism. If no benefit with Primidone, then I will do a trial of Sinemet or dopamine agonist or amantadine. Refer to physical therapy care of [facility] to optimize level of functioning to include strengthening of the left upper extremity. All questions and concerns were answered. Visit lasted 60 minutes. Given the 50% was spent reviewing her medical records as summarized above including recent laboratory work-up, discussion about her condition, potential etiology, prognosis, need to obtain medical records especially neuroimaging results, treatment options, medication    This note was created using voice recognition software. Despite editing, there may be syntax errors.

## 2022-05-11 ENCOUNTER — OFFICE VISIT (OUTPATIENT)
Dept: CARDIOLOGY CLINIC | Age: 67
End: 2022-05-11
Payer: MEDICAID

## 2022-05-11 VITALS
SYSTOLIC BLOOD PRESSURE: 124 MMHG | HEART RATE: 62 BPM | BODY MASS INDEX: 28.6 KG/M2 | DIASTOLIC BLOOD PRESSURE: 64 MMHG | WEIGHT: 155.4 LBS | OXYGEN SATURATION: 96 % | HEIGHT: 62 IN

## 2022-05-11 DIAGNOSIS — I10 HYPERTENSION, UNSPECIFIED TYPE: Primary | ICD-10-CM

## 2022-05-11 DIAGNOSIS — R00.2 PALPITATIONS: ICD-10-CM

## 2022-05-11 PROCEDURE — 99215 OFFICE O/P EST HI 40 MIN: CPT | Performed by: INTERNAL MEDICINE

## 2022-05-11 PROCEDURE — 93000 ELECTROCARDIOGRAM COMPLETE: CPT | Performed by: INTERNAL MEDICINE

## 2022-05-11 NOTE — PROGRESS NOTES
Elizabeth Thomas MD, MS, [facility]            HISTORY OF PRESENT ILLNESS:    Emma Del Toro is a 77 y.o. female for for evaluation of palpitations. She is followed by Dr. Leonie Licea. She has a past medical history of hypertension. She is currently on amlodipine 5 mg p.o. daily. Her blood pressure today is fantastic 124/64. Ports palpitations, frequent usually occurring in the middle of her chest while she is on the sofa at rest comes on suddenly can be very intense. She denies any exertional symptoms no chest pain shortness of breath. No heart failure symptoms no orthopnea PND or lower extremity edema. She can feel dizzy at times. An EKG done in our office reviewed and shows normal sinus rhythm labs from Dr. Leonie Licea reviewed and all are unremarkable her LDL cholesterol was 125. SUMMARY:   Problem List  Date Reviewed: 9/4/2018          Codes Class Noted    Palpitations ICD-10-CM: R00.2  ICD-9-CM: 785.1  5/11/2022        Hypertension ICD-10-CM: I10  ICD-9-CM: 401.9  5/11/2022              Current Outpatient Medications on File Prior to Visit   Medication Sig    primidone (MYSOLINE) 50 mg tablet Take 1 at bedtime x 1 wk; then 1 BID x 1 wk; then 1 TID (Patient taking differently: 100 mg. 1 1/2 every 4 hours)    amLODIPine (NORVASC) 5 mg tablet Take 5 mg by mouth daily.  cholecalciferol, vitamin D3, (VITAMIN D3) 2,000 unit tab Take  by mouth.  biotin 2,500 mcg tab Take 500 Units/day by mouth daily.  aspirin delayed-release 81 mg tablet Take  by mouth daily. (Patient not taking: Reported on 5/11/2022)     No current facility-administered medications on file prior to visit. CARDIOLOGY STUDIES TO DATE:  No results found for this visit on 05/11/22.               Chief Complaint   Patient presents with    New Patient    Hypertension       CARDIAC ROS:   positive for palpitations    Past Medical History:   Diagnosis Date    Adverse effect of anesthesia     During open dental work-local anesthesia- increase in heart rate     Blindness     retina issues- legally blind (Bilat. eyes)    Blurred vision     Constipation     Hair loss     Heart murmur     Hypertension     Parkinson disease ([address]        Family History   Problem Relation Age of Onset    Cancer Mother         uterine    Hypertension Mother     Hypertension Sister        Social History     Socioeconomic History    Marital status: LEGALLY      Spouse name: Not on file    Number of children: Not on file    Years of education: Not on file    Highest education level: Not on file   Occupational History    Not on file   Tobacco Use    Smoking status: Never Smoker    Smokeless tobacco: Never Used   Substance and Sexual Activity    Alcohol use: Yes     Alcohol/week: 1.0 standard drink     Types: 1 Glasses of wine per week     Comment: occ    Drug use: No    Sexual activity: Never   Other Topics Concern    Not on file   Social History Narrative    Not on file     Social Determinants of Health     Financial Resource Strain:     Difficulty of Paying Living Expenses: Not on file   Food Insecurity:     Worried About Running Out of Food in the Last Year: Not on file    Nikole of Food in the Last Year: Not on file   Transportation Needs:     Lack of Transportation (Medical): Not on file    Lack of Transportation (Non-Medical):  Not on file   Physical Activity:     Days of Exercise per Week: Not on file    Minutes of Exercise per Session: Not on file   Stress:     Feeling of Stress : Not on file   Social Connections:     Frequency of Communication with Friends and Family: Not on file    Frequency of Social Gatherings with Friends and Family: Not on file    Attends Spiritism Services: Not on file    Active Member of Clubs or Organizations: Not on file    Attends Club or Organization Meetings: Not on file    Marital Status: Not on file   Intimate Partner Violence:     Fear of Current or Ex-Partner: Not on file   [employer] Abused: Not on file    Physically Abused: Not on file    Sexually Abused: Not on file   Housing Stability:     Unable to Pay for Housing in the Last Year: Not on file    Number of Places Lived in the Last Year: Not on file    Unstable Housing in the Last Year: Not on file        GENERAL ROS:  A comprehensive review of systems was negative except for that written in the HPI. Visit Vitals  /64 (BP 1 Location: Left upper arm, BP Patient Position: Sitting)   Pulse 62   Ht 5' 2\" (1.575 m)   Wt 155 lb 6.4 oz (70.5 kg)   SpO2 96%   BMI 28.42 kg/m²       Wt Readings from Last 3 Encounters:   05/11/22 155 lb 6.4 oz (70.5 kg)   01/21/20 153 lb 8 oz (69.6 kg)   12/05/18 155 lb (70.3 kg)            BP Readings from Last 3 Encounters:   05/11/22 124/64   09/23/20 130/70   01/21/20 118/80       PHYSICAL EXAM  General appearance: alert, cooperative, no distress, appears stated age  Neck: supple, symmetrical, trachea midline, no adenopathy, thyroid: not enlarged, symmetric, no tenderness/mass/nodules, no carotid bruit and no JVD  Lungs: clear to auscultation bilaterally  Heart: regular rate and rhythm, S1, S2 normal, no murmur, click, rub or gallop  Extremities: extremities normal, atraumatic, no cyanosis or edema    No results found for: CHOL, CHOLX, CHLST, CHOLV, 799863, HDL, HDLP, LDL, LDLC, DLDLP, TGLX, TRIGL, TRIGP, CHHD, CHHDX    ASSESSMENT  Diagnoses and all orders for this visit:    1. Hypertension, unspecified type  -     AMB POC EKG ROUTINE W/ 12 LEADS, INTER & REP  -     CARDIAC HOLTER MONITOR; Future    2. Palpitations  -     CARDIAC HOLTER MONITOR; Future        Encounter Diagnoses   Name Primary?     Hypertension, unspecified type Yes    Palpitations      Orders Placed This Encounter    AMB POC EKG ROUTINE W/ 12 LEADS, INTER & REP           Mikael Wahl MD  5/11/2022        [address]  [address] [facility] [address]  [address]  [phone]) [address] [facility] [address], [address] [facility]  [phone] (P)  [phone] (F)

## 2022-05-11 NOTE — PROGRESS NOTES
Ekaterina Avila is a 77 y.o. female    Chief Complaint   Patient presents with    New Patient    Hypertension     ID [id number] interpretator     Chest pain No; some palpitations    SOB No    Dizziness No    Swelling sometimes in her feet     Refills No    Visit Vitals  /64 (BP 1 Location: Left upper arm, BP Patient Position: Sitting)   Pulse 62   Ht 5' 2\" (1.575 m)   Wt 155 lb 6.4 oz (70.5 kg)   SpO2 96%   BMI 28.42 kg/m²       1. Have you been to the ER, urgent care clinic since your last visit? Hospitalized since your last visit? No    2. Have you seen or consulted any other health care providers outside of the [address] [place] [address] since your last visit? Include any pap smears or colon screening.   No

## 2022-05-11 NOTE — LETTER
5/11/2022    Patient: Korey Bowser   YOB: 1955   Date of Visit: 5/11/2022     Micky Freedman MD  [address] [place] [address]  Via Fax: [phone]    Dear Micky Freedman MD,      Thank you for referring Ms. Korey Bowser to [facility] for evaluation. My notes for this consultation are attached. If you have questions, please do not hesitate to call me. I look forward to following your patient along with you.       Sincerely,    Gil Tom MD

## 2023-04-16 NOTE — TELEPHONE ENCOUNTER
Call patient's friend/Jabier back to update him that patient will need to have Home Sleep Study done and order will be sent to [facility] and they will receive [facility] from [facility] within the next few days.  Patient's friend verbalize understanding
No records from [facility] on chart.  Will order HSAT
Patient's friend/Jabier Subramanian (ok to talk with per PHI) called to follow up on sleep study for patient and and the next step. Patient was seen in office on 7/25/18, there is no clinical documentation or orders in chart. Please advise ASAP.
Started first trimester

## 2023-05-18 RX ORDER — PRIMIDONE 50 MG/1
TABLET ORAL
COMMUNITY
Start: 2020-09-23

## 2023-05-18 RX ORDER — ASPIRIN 81 MG/1
TABLET ORAL DAILY
COMMUNITY

## 2023-05-18 RX ORDER — AMLODIPINE BESYLATE 5 MG/1
5 TABLET ORAL DAILY
COMMUNITY

## 2023-07-11 LAB — MAMMOGRAPHY, EXTERNAL: NORMAL

## 2023-10-17 ENCOUNTER — TELEPHONE (OUTPATIENT)
Facility: CLINIC | Age: 68
End: 2023-10-17

## 2023-10-17 NOTE — TELEPHONE ENCOUNTER
Patient was left a voice message to call office to reschedule her 10.20.23 appt, NP will be out of the office

## 2024-03-03 ENCOUNTER — HOSPITAL ENCOUNTER (EMERGENCY)
Facility: HOSPITAL | Age: 69
Discharge: HOME | End: 2024-03-04
Attending: STUDENT IN AN ORGANIZED HEALTH CARE EDUCATION/TRAINING PROGRAM
Payer: MEDICAID

## 2024-03-03 ENCOUNTER — APPOINTMENT (OUTPATIENT)
Facility: HOSPITAL | Age: 69
End: 2024-03-03
Payer: MEDICAID

## 2024-03-03 DIAGNOSIS — R06.02 SHORTNESS OF BREATH: ICD-10-CM

## 2024-03-03 DIAGNOSIS — N30.00 ACUTE CYSTITIS WITHOUT HEMATURIA: ICD-10-CM

## 2024-03-03 DIAGNOSIS — K21.9 GASTROESOPHAGEAL REFLUX DISEASE, UNSPECIFIED WHETHER ESOPHAGITIS PRESENT: Primary | ICD-10-CM

## 2024-03-03 LAB
ALBUMIN SERPL-MCNC: 4.6 G/DL (ref 3.5–5.2)
ALBUMIN/GLOB SERPL: 1.5 (ref 1.1–2.2)
ALP SERPL-CCNC: 78 U/L (ref 35–104)
ALT SERPL-CCNC: 9 U/L (ref 10–35)
ANION GAP SERPL CALC-SCNC: 11 MMOL/L (ref 5–15)
AST SERPL-CCNC: 36 U/L (ref 10–35)
BASOPHILS # BLD: 0 K/UL (ref 0–1)
BASOPHILS NFR BLD: 0 % (ref 0–1)
BILIRUB SERPL-MCNC: 0.3 MG/DL (ref 0.2–1)
BUN SERPL-MCNC: 16 MG/DL (ref 8–23)
BUN/CREAT SERPL: 29 (ref 12–20)
CALCIUM SERPL-MCNC: 9.4 MG/DL (ref 8.8–10.2)
CHLORIDE SERPL-SCNC: 99 MMOL/L (ref 98–107)
CO2 SERPL-SCNC: 25 MMOL/L (ref 22–29)
CREAT SERPL-MCNC: 0.55 MG/DL (ref 0.5–0.9)
DIFFERENTIAL METHOD BLD: ABNORMAL
EOSINOPHIL # BLD: 0 K/UL (ref 0–0.4)
EOSINOPHIL NFR BLD: 0 %
ERYTHROCYTE [DISTWIDTH] IN BLOOD BY AUTOMATED COUNT: 11.8 % (ref 11.5–14.5)
GLOBULIN SER CALC-MCNC: 3 G/DL (ref 2–4)
GLUCOSE BLD STRIP.AUTO-MCNC: 136 MG/DL (ref 65–117)
GLUCOSE SERPL-MCNC: 137 MG/DL (ref 65–100)
HCT VFR BLD AUTO: 41.5 % (ref 35–47)
HGB BLD-MCNC: 14 G/DL (ref 11.5–16)
IMM GRANULOCYTES # BLD AUTO: 0 K/UL (ref 0–0.04)
IMM GRANULOCYTES NFR BLD AUTO: 0 % (ref 0–0.5)
LYMPHOCYTES # BLD: 1.7 K/UL (ref 0.8–3.5)
LYMPHOCYTES NFR BLD: 21 % (ref 12–49)
MAGNESIUM SERPL-MCNC: 2.3 MG/DL (ref 1.6–2.4)
MCH RBC QN AUTO: 31.7 PG (ref 26–34)
MCHC RBC AUTO-ENTMCNC: 33.7 G/DL (ref 30–36.5)
MCV RBC AUTO: 93.9 FL (ref 80–99)
MONOCYTES # BLD: 0.6 K/UL (ref 0–1)
MONOCYTES NFR BLD: 7 % (ref 5–13)
NEUTS SEG # BLD: 5.9 K/UL (ref 1.8–8)
NEUTS SEG NFR BLD: 72 % (ref 32–75)
NRBC # BLD: 0 K/UL (ref 0–0.01)
NRBC BLD-RTO: 0 PER 100 WBC
PLATELET # BLD AUTO: 389 K/UL (ref 150–400)
PMV BLD AUTO: 8.9 FL (ref 8.9–12.9)
POTASSIUM SERPL-SCNC: 4.1 MMOL/L (ref 3.5–5.1)
PROT SERPL-MCNC: 7.6 G/DL (ref 6.4–8.3)
RBC # BLD AUTO: 4.42 M/UL (ref 3.8–5.2)
SERVICE CMNT-IMP: ABNORMAL
SODIUM SERPL-SCNC: 135 MMOL/L (ref 136–145)
TROPONIN I BLD-MCNC: <0.04 NG/ML (ref 0–0.08)
WBC # BLD AUTO: 8.3 K/UL (ref 3.6–11)

## 2024-03-03 PROCEDURE — 84484 ASSAY OF TROPONIN QUANT: CPT

## 2024-03-03 PROCEDURE — 82962 GLUCOSE BLOOD TEST: CPT

## 2024-03-03 PROCEDURE — 93005 ELECTROCARDIOGRAM TRACING: CPT | Performed by: STUDENT IN AN ORGANIZED HEALTH CARE EDUCATION/TRAINING PROGRAM

## 2024-03-03 PROCEDURE — 99285 EMERGENCY DEPT VISIT HI MDM: CPT

## 2024-03-03 PROCEDURE — 36415 COLL VENOUS BLD VENIPUNCTURE: CPT

## 2024-03-03 PROCEDURE — 85025 COMPLETE CBC W/AUTO DIFF WBC: CPT

## 2024-03-03 PROCEDURE — 83735 ASSAY OF MAGNESIUM: CPT

## 2024-03-03 PROCEDURE — 71046 X-RAY EXAM CHEST 2 VIEWS: CPT

## 2024-03-03 PROCEDURE — 80053 COMPREHEN METABOLIC PANEL: CPT

## 2024-03-03 RX ORDER — ONDANSETRON 2 MG/ML
4 INJECTION INTRAMUSCULAR; INTRAVENOUS
Status: DISCONTINUED | OUTPATIENT
Start: 2024-03-03 | End: 2024-03-04 | Stop reason: HOSPADM

## 2024-03-03 ASSESSMENT — PAIN - FUNCTIONAL ASSESSMENT: PAIN_FUNCTIONAL_ASSESSMENT: NONE - DENIES PAIN

## 2024-03-04 VITALS
HEIGHT: 62 IN | WEIGHT: 139.99 LBS | OXYGEN SATURATION: 96 % | TEMPERATURE: 98 F | BODY MASS INDEX: 25.76 KG/M2 | DIASTOLIC BLOOD PRESSURE: 98 MMHG | RESPIRATION RATE: 17 BRPM | SYSTOLIC BLOOD PRESSURE: 136 MMHG | HEART RATE: 108 BPM

## 2024-03-04 LAB
APPEARANCE UR: ABNORMAL
BACTERIA URNS QL MICRO: ABNORMAL /HPF
BILIRUB UR QL: NEGATIVE
COLOR UR: ABNORMAL
EKG ATRIAL RATE: 106 BPM
EKG DIAGNOSIS: NORMAL
EKG P AXIS: 75 DEGREES
EKG P-R INTERVAL: 164 MS
EKG Q-T INTERVAL: 320 MS
EKG QRS DURATION: 72 MS
EKG QTC CALCULATION (BAZETT): 425 MS
EKG R AXIS: 70 DEGREES
EKG T AXIS: 80 DEGREES
EKG VENTRICULAR RATE: 106 BPM
EPITH CASTS URNS QL MICRO: ABNORMAL /LPF
GLUCOSE UR STRIP.AUTO-MCNC: NEGATIVE MG/DL
HGB UR QL STRIP: NEGATIVE
HYALINE CASTS URNS QL MICRO: ABNORMAL /LPF
KETONES UR QL STRIP.AUTO: NEGATIVE MG/DL
LEUKOCYTE ESTERASE UR QL STRIP.AUTO: ABNORMAL
NITRITE UR QL STRIP.AUTO: POSITIVE
PH UR STRIP: 6.5 (ref 5–8)
PROT UR STRIP-MCNC: NEGATIVE MG/DL
RBC #/AREA URNS HPF: ABNORMAL /HPF
SP GR UR REFRACTOMETRY: 1.01 (ref 1–1.03)
UROBILINOGEN UR QL STRIP.AUTO: 0.2 EU/DL (ref 0.2–1)
WBC URNS QL MICRO: ABNORMAL /HPF (ref 0–4)

## 2024-03-04 PROCEDURE — 87077 CULTURE AEROBIC IDENTIFY: CPT

## 2024-03-04 PROCEDURE — 93010 ELECTROCARDIOGRAM REPORT: CPT | Performed by: SPECIALIST

## 2024-03-04 PROCEDURE — 6370000000 HC RX 637 (ALT 250 FOR IP)

## 2024-03-04 PROCEDURE — 87186 SC STD MICRODIL/AGAR DIL: CPT

## 2024-03-04 PROCEDURE — 81001 URINALYSIS AUTO W/SCOPE: CPT

## 2024-03-04 PROCEDURE — 2580000003 HC RX 258

## 2024-03-04 PROCEDURE — 87086 URINE CULTURE/COLONY COUNT: CPT

## 2024-03-04 RX ORDER — 0.9 % SODIUM CHLORIDE 0.9 %
1000 INTRAVENOUS SOLUTION INTRAVENOUS ONCE
Status: COMPLETED | OUTPATIENT
Start: 2024-03-04 | End: 2024-03-04

## 2024-03-04 RX ORDER — NITROFURANTOIN 25; 75 MG/1; MG/1
100 CAPSULE ORAL 2 TIMES DAILY
Qty: 14 CAPSULE | Refills: 0 | Status: SHIPPED | OUTPATIENT
Start: 2024-03-04 | End: 2024-03-11

## 2024-03-04 RX ORDER — ONDANSETRON 4 MG/1
4 TABLET, ORALLY DISINTEGRATING ORAL 3 TIMES DAILY PRN
Qty: 21 TABLET | Refills: 0 | Status: SHIPPED | OUTPATIENT
Start: 2024-03-04

## 2024-03-04 RX ORDER — NITROFURANTOIN 25; 75 MG/1; MG/1
100 CAPSULE ORAL
Status: DISCONTINUED | OUTPATIENT
Start: 2024-03-04 | End: 2024-03-04 | Stop reason: HOSPADM

## 2024-03-04 RX ADMIN — ALUMINUM HYDROXIDE, MAGNESIUM HYDROXIDE, AND SIMETHICONE 40 ML: 200; 200; 20 SUSPENSION ORAL at 00:09

## 2024-03-04 RX ADMIN — SODIUM CHLORIDE 1000 ML: 9 INJECTION, SOLUTION INTRAVENOUS at 00:35

## 2024-03-04 ASSESSMENT — ENCOUNTER SYMPTOMS
CONSTIPATION: 0
ABDOMINAL PAIN: 0
SORE THROAT: 0
VOMITING: 0
NAUSEA: 1
DIARRHEA: 0
COUGH: 0
RHINORRHEA: 0
SHORTNESS OF BREATH: 1

## 2024-03-04 NOTE — ED PROVIDER NOTES
[facility] EMERGENCY DEPT  EMERGENCY DEPARTMENT ENCOUNTER      Pt Name: Palma Domínguez  MRN: [id number]  Birthdate 1955  Date of evaluation: 3/3/2024  Provider: JAMARI Hollins NP    CHIEF COMPLAINT       Chief Complaint   Patient presents with    Shortness of Breath    Chest Pain         HISTORY OF PRESENT ILLNESS   (Location/Symptom, Timing/Onset, Context/Setting, Quality, Duration, Modifying Factors, Severity)  Note limiting factors.   Palma Domínguez is a 68 y.o. female presenting to the ED via EMS c/o increased SOB and burning chest pain that started around 20:00 today. Pt reports she is able to drink water. Pt reports she was prescribed omeprazole but has not taken medication since it \"made me feel bad\". + nausea. + cramping in legs. + abdominal bloating. LBM: today morning. Pt reports taking a laxative everyday to help with constipation. Pt reports feeling like food was still in her chest after she finished eating. + dysuria.    Denies taking any medications to help with her reflux, vomiting, abnormal leg swelling, fevers, abdominal pains, hx DM, choking on food.     Medical hx: Parkinson's,  heart murmur, HTN, blindness (low vision in left eye), constipation  Social hx: denies smoking, denies etoh, denies drug, denies marijuana use.       The history is provided by the patient. The history is limited by a language barrier. A  was used.         Review of External Medical Records:     Nursing Notes were reviewed.    REVIEW OF SYSTEMS    (2-9 systems for level 4, 10 or more for level 5)     Review of Systems   Constitutional:  Negative for activity change, appetite change, chills and fever.   HENT:  Negative for rhinorrhea and sore throat.    Respiratory:  Positive for shortness of breath. Negative for cough.    Cardiovascular:  Positive for chest pain.   Gastrointestinal:  Positive for nausea. Negative for abdominal pain, constipation, diarrhea and vomiting.   Genitourinary:  Positive

## 2024-03-04 NOTE — ED TRIAGE NOTES
Pt brought in by EMS for c/o pain to her L rib/breast area, SOB, and \"food stuck\" after eating.  Pt's BG en route read \"LOW\" therefore given glucose gel, repeat BG read 115.  VSS and EKG normal en route.  Upon arrival pt is heard belching.  Pt states she ate dinner and began feeling bloating, SOB, and L sided rib pain near the breast.

## 2024-03-08 LAB
BACTERIA SPEC CULT: ABNORMAL
BACTERIA SPEC CULT: ABNORMAL
CC UR VC: ABNORMAL
SERVICE CMNT-IMP: ABNORMAL

## 2024-03-10 RX ORDER — CEPHALEXIN 500 MG/1
500 CAPSULE ORAL 2 TIMES DAILY
Qty: 14 CAPSULE | Refills: 0 | Status: SHIPPED | OUTPATIENT
Start: 2024-03-10 | End: 2024-03-17

## 2024-05-06 ENCOUNTER — OFFICE VISIT (OUTPATIENT)
Age: 69
End: 2024-05-06
Payer: MEDICAID

## 2024-05-06 ENCOUNTER — TELEPHONE (OUTPATIENT)
Age: 69
End: 2024-05-06

## 2024-05-06 VITALS
WEIGHT: 135.2 LBS | OXYGEN SATURATION: 92 % | HEART RATE: 72 BPM | HEIGHT: 62 IN | SYSTOLIC BLOOD PRESSURE: 136 MMHG | BODY MASS INDEX: 24.88 KG/M2 | DIASTOLIC BLOOD PRESSURE: 62 MMHG

## 2024-05-06 DIAGNOSIS — I10 ESSENTIAL (PRIMARY) HYPERTENSION: Primary | ICD-10-CM

## 2024-05-06 DIAGNOSIS — R00.2 PALPITATIONS: ICD-10-CM

## 2024-05-06 DIAGNOSIS — R07.9 CHEST PAIN, UNSPECIFIED TYPE: Primary | ICD-10-CM

## 2024-05-06 DIAGNOSIS — I10 ESSENTIAL (PRIMARY) HYPERTENSION: ICD-10-CM

## 2024-05-06 PROCEDURE — 1123F ACP DISCUSS/DSCN MKR DOCD: CPT | Performed by: SPECIALIST

## 2024-05-06 PROCEDURE — 3075F SYST BP GE 130 - 139MM HG: CPT | Performed by: SPECIALIST

## 2024-05-06 PROCEDURE — 3078F DIAST BP <80 MM HG: CPT | Performed by: SPECIALIST

## 2024-05-06 PROCEDURE — 99204 OFFICE O/P NEW MOD 45 MIN: CPT | Performed by: SPECIALIST

## 2024-05-06 RX ORDER — CARBIDOPA AND LEVODOPA 25; 100 MG/1; MG/1
1 TABLET, EXTENDED RELEASE ORAL DAILY
COMMUNITY

## 2024-05-06 RX ORDER — LISINOPRIL 5 MG/1
5 TABLET ORAL DAILY
COMMUNITY

## 2024-05-06 NOTE — PROGRESS NOTES
NAME Palma Domínguez         1955      MRN    [id number]      LAST OFFICE APPOINTMENT: Visit date not found     DIAGNOSIS    ICD-10-CM    1. Essential (primary) hypertension  I10       2. Palpitations  R00.2           HOME MEDICATION  Current Outpatient Medications   Medication Sig    ondansetron (ZOFRAN-ODT) 4 MG disintegrating tablet Take 1 tablet by mouth 3 times daily as needed for Nausea or Vomiting    amLODIPine (NORVASC) 5 MG tablet Take 1 tablet by mouth daily    aspirin 81 MG EC tablet Take by mouth daily    Biotin 2.5 MG TABS Take 500 Units/day by mouth daily    Cholecalciferol 50 MCG (2000) TABS Take by mouth    primidone (MYSOLINE) 50 MG tablet Take 1 at bedtime x 1 wk; then 1 BID x 1 wk; then 1 TID     No current facility-administered medications for this visit.       VITAL SIGNS  Wt Readings from Last 3 Encounters:   24 61.3 kg (135 lb 3.2 oz)   24 63.5 kg (139 lb 15.9 oz)   22 70.5 kg (155 lb 6.4 oz)     BP Readings from Last 3 Encounters:   24 136/62   24 (!) 136/98   22 124/64     Pulse Readings from Last 3 Encounters:   24 72   24 (!) 108   22 62         SPECIALTY COMMENTS  No specialty comments available.    Patient states that at night she feels some chest pressure sometimes.   Patient was seen at urgent care for pressure in head, dizziness--- [facility] about 1.5 mo ago.   Deb Bansal--- 2024 same as urgent care visit cx.  Patient states that when her bp drops she gets dizzy and it takes her longer to get up and going. No syncope episodes as of today.  Patient cx of palpitations. Patients son states that she can feel this moving throughout her body.

## 2024-05-06 NOTE — PROGRESS NOTES
CARDIOLOGY OFFICE NOTE    Walter Wynn MD, [facility]    [address], [place] [address]  Phone [phone]; Fax [phone]  Mobile [phone]   Voice Mail [phone]    Primary care: Svitlana Islas DO       ATTENTION:   This medical record was transcribed using an electronic medical records/speech recognition system.  Although proofread, it may and can contain electronic, spelling and other errors.  Corrections may be executed at a later time.  Please feel free to contact us for any clarifications as needed.             Palma Domínguez is a 68 y.o. female with  referred for  palpitations.       Cardiac risk factors for heart disease include: Age, family history, hypertension      HISTORY OF PRESENTING ILLNESS    MsMacey/Mr. Palma Domínguez  68 y.o. is a pleasant lady who comes with her son today.  She has a history of hypertension which is rather labile.  She had been on amlodipine and lisinopril but it has been dropping so the lisinopril was discontinued by her neurologist..  She also is experiencing chest discomfort usually at rest.  She has no PND or orthopnea.  We talked extensively about Parkinson's and how it affects blood pressure and how it can be very labile.  Additionally she was told she had JB but is not wearing CPAP.  There is a strong family history of heart disease in her mother.     ECG revealed sinus tachycardia     ACTIVE PROBLEM LIST     Patient Active Problem List    Diagnosis Date Noted    Palpitations 05/11/2022    Hypertension 05/11/2022           PAST MEDICAL HISTORY     Past Medical History:   Diagnosis Date    Adverse effect of anesthesia     During open dental work-local anesthesia- increase in heart rate     Blindness     retina issues- legally blind (Bilat. eyes)    Blurred vision     Constipation     Hair loss     Heart murmur     Hypertension     Parkinson disease (HCC)            PAST SURGICAL HISTORY     Past Surgical History:   Procedure

## 2024-05-20 ENCOUNTER — ANCILLARY PROCEDURE (OUTPATIENT)
Age: 69
End: 2024-05-20
Payer: MEDICAID

## 2024-05-20 VITALS
HEIGHT: 62 IN | DIASTOLIC BLOOD PRESSURE: 62 MMHG | SYSTOLIC BLOOD PRESSURE: 102 MMHG | BODY MASS INDEX: 24.84 KG/M2 | WEIGHT: 135 LBS | HEART RATE: 63 BPM

## 2024-05-20 VITALS — BODY MASS INDEX: 24.84 KG/M2 | HEIGHT: 62 IN | WEIGHT: 135 LBS

## 2024-05-20 DIAGNOSIS — R07.9 CHEST PAIN, UNSPECIFIED TYPE: ICD-10-CM

## 2024-05-20 DIAGNOSIS — I10 ESSENTIAL (PRIMARY) HYPERTENSION: ICD-10-CM

## 2024-05-20 LAB
ECHO AO ROOT DIAM: 3 CM
ECHO AO ROOT INDEX: 1.85 CM/M2
ECHO AV AREA PEAK VELOCITY: 2.7 CM2
ECHO AV AREA VTI: 2.6 CM2
ECHO AV AREA/BSA PEAK VELOCITY: 1.7 CM2/M2
ECHO AV AREA/BSA VTI: 1.6 CM2/M2
ECHO AV MEAN GRADIENT: 2 MMHG
ECHO AV MEAN VELOCITY: 0.7 M/S
ECHO AV PEAK GRADIENT: 4 MMHG
ECHO AV PEAK VELOCITY: 1 M/S
ECHO AV VELOCITY RATIO: 0.8
ECHO AV VTI: 19.9 CM
ECHO BSA: 1.64 M2
ECHO EST RA PRESSURE: 3 MMHG
ECHO LA DIAMETER INDEX: 1.54 CM/M2
ECHO LA DIAMETER: 2.5 CM
ECHO LA TO AORTIC ROOT RATIO: 0.83
ECHO LA VOL A-L A2C: 24 ML (ref 22–52)
ECHO LA VOL A-L A4C: 24 ML (ref 22–52)
ECHO LA VOL MOD A2C: 24 ML (ref 22–52)
ECHO LA VOL MOD A4C: 24 ML (ref 22–52)
ECHO LA VOLUME AREA LENGTH: 26 ML
ECHO LA VOLUME INDEX A-L A2C: 15 ML/M2 (ref 16–34)
ECHO LA VOLUME INDEX A-L A4C: 15 ML/M2 (ref 16–34)
ECHO LA VOLUME INDEX AREA LENGTH: 16 ML/M2 (ref 16–34)
ECHO LA VOLUME INDEX MOD A2C: 15 ML/M2 (ref 16–34)
ECHO LA VOLUME INDEX MOD A4C: 15 ML/M2 (ref 16–34)
ECHO LV E' LATERAL VELOCITY: 9 CM/S
ECHO LV E' SEPTAL VELOCITY: 7 CM/S
ECHO LV EDV A2C: 87 ML
ECHO LV EDV A4C: 84 ML
ECHO LV EDV BP: 86 ML (ref 56–104)
ECHO LV EDV INDEX A4C: 52 ML/M2
ECHO LV EDV INDEX BP: 53 ML/M2
ECHO LV EDV NDEX A2C: 54 ML/M2
ECHO LV EJECTION FRACTION A2C: 65 %
ECHO LV EJECTION FRACTION A4C: 64 %
ECHO LV EJECTION FRACTION BIPLANE: 63 % (ref 55–100)
ECHO LV ESV A2C: 30 ML
ECHO LV ESV A4C: 30 ML
ECHO LV ESV BP: 32 ML (ref 19–49)
ECHO LV ESV INDEX A2C: 19 ML/M2
ECHO LV ESV INDEX A4C: 19 ML/M2
ECHO LV ESV INDEX BP: 20 ML/M2
ECHO LV FRACTIONAL SHORTENING: 34 % (ref 28–44)
ECHO LV INTERNAL DIMENSION DIASTOLE INDEX: 2.35 CM/M2
ECHO LV INTERNAL DIMENSION DIASTOLIC: 3.8 CM (ref 3.9–5.3)
ECHO LV INTERNAL DIMENSION SYSTOLIC INDEX: 1.54 CM/M2
ECHO LV INTERNAL DIMENSION SYSTOLIC: 2.5 CM
ECHO LV IVSD: 0.7 CM (ref 0.6–0.9)
ECHO LV MASS 2D: 71.9 G (ref 67–162)
ECHO LV MASS INDEX 2D: 44.4 G/M2 (ref 43–95)
ECHO LV POSTERIOR WALL DIASTOLIC: 0.7 CM (ref 0.6–0.9)
ECHO LV RELATIVE WALL THICKNESS RATIO: 0.37
ECHO LVOT AREA: 3.1 CM2
ECHO LVOT AV VTI INDEX: 0.81
ECHO LVOT DIAM: 2 CM
ECHO LVOT MEAN GRADIENT: 2 MMHG
ECHO LVOT PEAK GRADIENT: 3 MMHG
ECHO LVOT PEAK VELOCITY: 0.8 M/S
ECHO LVOT STROKE VOLUME INDEX: 31.4 ML/M2
ECHO LVOT SV: 50.9 ML
ECHO LVOT VTI: 16.2 CM
ECHO MV A VELOCITY: 0.61 M/S
ECHO MV AREA PHT: 3.6 CM2
ECHO MV AREA VTI: 2.7 CM2
ECHO MV E DECELERATION TIME (DT): 212.7 MS
ECHO MV E VELOCITY: 0.64 M/S
ECHO MV E/A RATIO: 1.05
ECHO MV E/E' LATERAL: 7.11
ECHO MV E/E' RATIO (AVERAGED): 8.13
ECHO MV E/E' SEPTAL: 9.14
ECHO MV LVOT VTI INDEX: 1.17
ECHO MV MAX VELOCITY: 0.8 M/S
ECHO MV MEAN GRADIENT: 1 MMHG
ECHO MV MEAN VELOCITY: 0.4 M/S
ECHO MV PEAK GRADIENT: 2 MMHG
ECHO MV PRESSURE HALF TIME (PHT): 61.7 MS
ECHO MV VTI: 19 CM
ECHO RA AREA 4C: 8 CM2
ECHO RA END SYSTOLIC VOLUME APICAL 4 CHAMBER INDEX BSA: 10 ML/M2
ECHO RA VOLUME: 16 ML
ECHO RV INTERNAL DIMENSION: 3.4 CM
ECHO RV TAPSE: 1.7 CM (ref 1.7–?)

## 2024-05-20 PROCEDURE — 93306 TTE W/DOPPLER COMPLETE: CPT | Performed by: SPECIALIST

## 2024-05-20 PROCEDURE — PBSHW PBB SHADOW CHARGE: Performed by: SPECIALIST

## 2024-05-20 PROCEDURE — 78452 HT MUSCLE IMAGE SPECT MULT: CPT | Performed by: STUDENT IN AN ORGANIZED HEALTH CARE EDUCATION/TRAINING PROGRAM

## 2024-05-20 PROCEDURE — A9500 TC99M SESTAMIBI: HCPCS | Performed by: STUDENT IN AN ORGANIZED HEALTH CARE EDUCATION/TRAINING PROGRAM

## 2024-05-20 RX ORDER — REGADENOSON 0.08 MG/ML
0.4 INJECTION, SOLUTION INTRAVENOUS
Status: COMPLETED | OUTPATIENT
Start: 2024-05-20 | End: 2024-05-20

## 2024-05-20 RX ORDER — TETRAKIS(2-METHOXYISOBUTYLISOCYANIDE)COPPER(I) TETRAFLUOROBORATE 1 MG/ML
26.2 INJECTION, POWDER, LYOPHILIZED, FOR SOLUTION INTRAVENOUS
Status: COMPLETED | OUTPATIENT
Start: 2024-05-20 | End: 2024-05-20

## 2024-05-20 RX ORDER — TETRAKIS(2-METHOXYISOBUTYLISOCYANIDE)COPPER(I) TETRAFLUOROBORATE 1 MG/ML
8.4 INJECTION, POWDER, LYOPHILIZED, FOR SOLUTION INTRAVENOUS
Status: COMPLETED | OUTPATIENT
Start: 2024-05-20 | End: 2024-05-20

## 2024-05-20 RX ADMIN — TECHNETIUM TC-99M SESTAMIBI 8.4 MILLICURIE: 1 INJECTION INTRAVENOUS at 09:58

## 2024-05-20 RX ADMIN — REGADENOSON 0.4 MG: 0.08 INJECTION, SOLUTION INTRAVENOUS at 11:06

## 2024-05-20 RX ADMIN — TECHNETIUM TC-99M SESTAMIBI 26.2 MILLICURIE: 1 INJECTION INTRAVENOUS at 11:00

## 2024-05-20 NOTE — RESULT ENCOUNTER NOTE
Your echocardiogram reveals normal heart function and this is great news.     All the best,    Walter

## 2024-05-21 LAB
ECHO BSA: 1.64 M2
NUC STRESS EJECTION FRACTION: 75 %
STRESS BASELINE DIAS BP: 62 MMHG
STRESS BASELINE HR: 71 BPM
STRESS BASELINE ST DEPRESSION: 0 MM
STRESS BASELINE SYS BP: 102 MMHG
STRESS ESTIMATED WORKLOAD: 0 METS
STRESS EXERCISE DUR MIN: 0 MIN
STRESS EXERCISE DUR SEC: 0 SEC
STRESS O2 SAT PEAK: 99 %
STRESS O2 SAT REST: 99 %
STRESS PEAK DIAS BP: 60 MMHG
STRESS PEAK SYS BP: 128 MMHG
STRESS PERCENT HR ACHIEVED: 69 %
STRESS POST PEAK HR: 105 BPM
STRESS RATE PRESSURE PRODUCT: NORMAL BPM*MMHG
STRESS ST DEPRESSION: 0 MM
STRESS TARGET HR: 152 BPM
TID: 1.24

## 2024-05-21 NOTE — RESULT ENCOUNTER NOTE
Your stress test reveals normal blood flow  to your heart muscle and this is great news.    All the best,     Walter

## 2024-06-21 ENCOUNTER — OFFICE VISIT (OUTPATIENT)
Age: 69
End: 2024-06-21
Payer: MEDICAID

## 2024-06-21 VITALS
HEIGHT: 62 IN | DIASTOLIC BLOOD PRESSURE: 88 MMHG | BODY MASS INDEX: 25.25 KG/M2 | OXYGEN SATURATION: 99 % | WEIGHT: 137.2 LBS | HEART RATE: 89 BPM | SYSTOLIC BLOOD PRESSURE: 142 MMHG

## 2024-06-21 DIAGNOSIS — R42 DIZZINESS: ICD-10-CM

## 2024-06-21 DIAGNOSIS — R07.9 CHEST PAIN, UNSPECIFIED TYPE: Primary | ICD-10-CM

## 2024-06-21 DIAGNOSIS — R00.2 PALPITATIONS: ICD-10-CM

## 2024-06-21 DIAGNOSIS — I10 ESSENTIAL (PRIMARY) HYPERTENSION: ICD-10-CM

## 2024-06-21 DIAGNOSIS — R07.9 CHEST PAIN, UNSPECIFIED TYPE: ICD-10-CM

## 2024-06-21 PROCEDURE — 3079F DIAST BP 80-89 MM HG: CPT | Performed by: SPECIALIST

## 2024-06-21 PROCEDURE — 99214 OFFICE O/P EST MOD 30 MIN: CPT | Performed by: SPECIALIST

## 2024-06-21 PROCEDURE — 1123F ACP DISCUSS/DSCN MKR DOCD: CPT | Performed by: SPECIALIST

## 2024-06-21 PROCEDURE — 3077F SYST BP >= 140 MM HG: CPT | Performed by: SPECIALIST

## 2024-06-21 NOTE — PROGRESS NOTES
Chief Complaint   Patient presents with    Palpitations    Hypertension     Vitals:    24 1402   BP: (!) 142/88   Site: Left Upper Arm   Position: Sitting   Pulse: 89   SpO2: 99%   Weight: 62.2 kg (137 lb 3.2 oz)   Height: 1.575 m (5' 2\")     Chest pain: DENIED     Recent hospital stays: DENIED     Refills: DENIED     NAME Palma KISER    1955      MRN    [id number]      LAST OFFICE APPOINTMENT: 2024     DIAGNOSIS    ICD-10-CM    1. Chest pain, unspecified type  R07.9       2. Essential (primary) hypertension  I10       3. Dizziness  R42       4. Palpitations  R00.2           HOME MEDICATION  Current Outpatient Medications   Medication Sig    lisinopril (PRINIVIL;ZESTRIL) 5 MG tablet Take 1 tablet by mouth daily    carbidopa-levodopa (SINEMET)  MG per tablet Take 2.5 tablets by mouth Every 3 hours    carbidopa-levodopa (SINEMET CR)  MG per extended release tablet Take 1 tablet by mouth daily    amLODIPine (NORVASC) 5 MG tablet Take 1 tablet by mouth daily As needed     No current facility-administered medications for this visit.       VITAL SIGNS  Wt Readings from Last 3 Encounters:   24 62.2 kg (137 lb 3.2 oz)   24 61.2 kg (135 lb)   24 61.2 kg (135 lb)     BP Readings from Last 3 Encounters:   24 (!) 142/88   24 102/62   24 136/62     Pulse Readings from Last 3 Encounters:   24 89   24 63   24 72         SPECIALTY COMMENTS  1) cholesterol  2023: , HDL 91, ,

## 2024-06-21 NOTE — PROGRESS NOTES
CARDIOLOGY OFFICE NOTE    Walter Wynn MD, [facility]    [address], [place] [address]  Phone [phone]; Fax [phone]  Mobile [phone]   Voice Mail [phone]    Primary care: Svitlana Islas DO       ATTENTION:   This medical record was transcribed using an electronic medical records/speech recognition system.  Although proofread, it may and can contain electronic, spelling and other errors.  Corrections may be executed at a later time.  Please feel free to contact us for any clarifications as needed.             Palma Domínguez is a 68 y.o. female with  referred for  palpitations.       Cardiac risk factors for heart disease include: Age, family history, hypertension      HISTORY OF PRESENTING ILLNESS    MsMacey/Mr. Palma Domínguez  68 y.o. is a pleasant lady who has labile hypertension.  Today her blood pressure is elevated and she is on lisinopril and amlodipine.  We talked about when to take these medicines.  Since her blood pressure is low in the morning I suggest she take her lisinopril at noon and her amlodipine in the evening around 8 PM.  She has a history of JB but is not wearing CPAP and understands the risk consequences of untreated sleep apnea.  Cholesterol profile indicates LDL is above goal we will recheck her cholesterol.  She does have Parkinson's and recently had a device placed and she has what appears to be a pacemaker right upper chest for her Parkinson's appears to be healing nicely and it does not appear warm there are some redness but it appears to be related to her healing process.  I told them should again become warm or any changes that they need to contact the individual who put it in.       ACTIVE PROBLEM LIST     Patient Active Problem List    Diagnosis Date Noted    Palpitations 05/11/2022    Hypertension 05/11/2022           PAST MEDICAL HISTORY     Past Medical History:   Diagnosis Date    Adverse effect of anesthesia     During open dental

## 2024-06-21 NOTE — PATIENT INSTRUCTIONS

## 2024-07-02 LAB
ALBUMIN SERPL-MCNC: 4.8 G/DL (ref 3.9–4.9)
ALP SERPL-CCNC: 80 IU/L (ref 44–121)
ALT SERPL-CCNC: 5 IU/L (ref 0–32)
AST SERPL-CCNC: 19 IU/L (ref 0–40)
BILIRUB DIRECT SERPL-MCNC: 0.11 MG/DL (ref 0–0.4)
BILIRUB SERPL-MCNC: 0.4 MG/DL (ref 0–1.2)
CHOLEST SERPL-MCNC: 230 MG/DL (ref 100–199)
HDLC SERPL-MCNC: 112 MG/DL
LDLC SERPL CALC-MCNC: 104 MG/DL (ref 0–99)
PROT SERPL-MCNC: 7.2 G/DL (ref 6–8.5)
TRIGL SERPL-MCNC: 83 MG/DL (ref 0–149)
VLDLC SERPL CALC-MCNC: 14 MG/DL (ref 5–40)

## 2024-10-24 ENCOUNTER — HOSPITAL ENCOUNTER (EMERGENCY)
Facility: HOSPITAL | Age: 69
Discharge: HOME | End: 2024-10-24
Attending: EMERGENCY MEDICINE
Payer: MEDICAID

## 2024-10-24 ENCOUNTER — APPOINTMENT (OUTPATIENT)
Facility: HOSPITAL | Age: 69
End: 2024-10-24
Payer: MEDICAID

## 2024-10-24 VITALS
OXYGEN SATURATION: 100 % | BODY MASS INDEX: 22.08 KG/M2 | WEIGHT: 120 LBS | HEIGHT: 62 IN | HEART RATE: 90 BPM | DIASTOLIC BLOOD PRESSURE: 60 MMHG | SYSTOLIC BLOOD PRESSURE: 142 MMHG | RESPIRATION RATE: 18 BRPM | TEMPERATURE: 98.6 F

## 2024-10-24 DIAGNOSIS — J06.9 VIRAL URI WITH COUGH: Primary | ICD-10-CM

## 2024-10-24 DIAGNOSIS — B33.8 RESPIRATORY SYNCYTIAL VIRUS (RSV): ICD-10-CM

## 2024-10-24 LAB
FLUAV RNA SPEC QL NAA+PROBE: NOT DETECTED
FLUBV RNA SPEC QL NAA+PROBE: NOT DETECTED
RSV RNA NPH QL NAA+PROBE: DETECTED
S PYO DNA THROAT QL NAA+PROBE: NOT DETECTED
SARS-COV-2 RNA RESP QL NAA+PROBE: NOT DETECTED
SOURCE: NORMAL
SOURCE: NORMAL

## 2024-10-24 PROCEDURE — 87634 RSV DNA/RNA AMP PROBE: CPT

## 2024-10-24 PROCEDURE — 71046 X-RAY EXAM CHEST 2 VIEWS: CPT

## 2024-10-24 PROCEDURE — 93005 ELECTROCARDIOGRAM TRACING: CPT

## 2024-10-24 PROCEDURE — 87636 SARSCOV2 & INF A&B AMP PRB: CPT

## 2024-10-24 PROCEDURE — 87651 STREP A DNA AMP PROBE: CPT

## 2024-10-24 PROCEDURE — 99285 EMERGENCY DEPT VISIT HI MDM: CPT

## 2024-10-24 RX ORDER — OXYMETAZOLINE HYDROCHLORIDE 0.05 G/100ML
2 SPRAY NASAL 2 TIMES DAILY
Qty: 1 EACH | Refills: 0 | Status: SHIPPED | OUTPATIENT
Start: 2024-10-24 | End: 2024-11-23

## 2024-10-24 RX ORDER — GUAIFENESIN 200 MG/10ML
200 LIQUID ORAL 3 TIMES DAILY PRN
Qty: 180 ML | Refills: 0 | Status: SHIPPED | OUTPATIENT
Start: 2024-10-24

## 2024-10-24 RX ORDER — GUAIFENESIN 600 MG/1
600 TABLET, EXTENDED RELEASE ORAL 2 TIMES DAILY
Qty: 30 TABLET | Refills: 0 | Status: SHIPPED | OUTPATIENT
Start: 2024-10-24 | End: 2024-10-24 | Stop reason: ALTCHOICE

## 2024-10-24 RX ORDER — BENZONATATE 100 MG/1
100-200 CAPSULE ORAL 3 TIMES DAILY PRN
Qty: 60 CAPSULE | Refills: 0 | Status: SHIPPED | OUTPATIENT
Start: 2024-10-24 | End: 2024-11-03

## 2024-10-24 RX ORDER — ACETAMINOPHEN 500 MG
500 TABLET ORAL 4 TIMES DAILY PRN
Qty: 360 TABLET | Refills: 0 | Status: SHIPPED | OUTPATIENT
Start: 2024-10-24

## 2024-10-24 NOTE — DISCHARGE INSTRUCTIONS
Discussed visit today.  Please follow-up with primary care provider for further evaluation.  Please take the medications at home to treat the symptoms.    Return to the emergency room with any worsening of symptoms.    Visita discutida hoy.  Anoop un seguimiento con el proveedor de atención primaria para maria luz evaluación adicional.  [place] los medicamentos en casa para tratar los síntomas.    Regresar a la lesly de emergencias si los síntomas empeoran.

## 2024-10-24 NOTE — ED TRIAGE NOTES
Pt denies , wants her son to translate.   Pt c/o congestion, cough and fatigue x 1 week. Pt states it has gotten worse past few nights. Pt also states her grandson has RSV and is concerned she as it too.  Patient arrives to ED ambulatory w/o difficulty. No acute distress noted in triage. A&O x 4. Skin is warm, dry & intact on obs.

## 2024-10-24 NOTE — ED PROVIDER NOTES
reading. COVID and influenza negative. Strep not detected. RSV detected. Chest x-ray shows no acute process.  Discussed results with the patient.  Discussed that she does have RSV which is like a viral infection and therefore we will treat symptomatically.  Recommended to return to the emergency room if she develops any worsening of pain or shortness of breath.  Patient is in no acute distress and stable for discharge. Patient is agreeable to plan. All questions answered. Return precautions provided and discharged home at this time.       Problems Addressed:  Respiratory syncytial virus (RSV): acute illness or injury  Viral URI with cough: acute illness or injury    Amount and/or Complexity of Data Reviewed  Labs: ordered. Decision-making details documented in ED Course.  Radiology: ordered. Decision-making details documented in ED Course.  ECG/medicine tests: ordered. Decision-making details documented in ED Course.    Risk  OTC drugs.  Prescription drug management.      Procedures      FINAL IMPRESSION      1. Viral URI with cough    2. Respiratory syncytial virus (RSV)          DISPOSITION/PLAN   DISPOSITION Decision To Discharge 10/24/2024 01:46:10 PM      PATIENT REFERRED TO:  [facility] EMERGENCY DEPT  [address]  [facility] [address] [phone]  Go to   If symptoms worsen, As needed    Svitlana Islas DO  [address] [address] [phone]    Schedule an appointment as soon as possible for a visit         DISCHARGE MEDICATIONS:  New Prescriptions    ACETAMINOPHEN (TYLENOL) 500 MG TABLET    Take 1 tablet by mouth 4 times daily as needed for Pain    BENZONATATE (TESSALON) 100 MG CAPSULE    Take 1-2 capsules by mouth 3 times daily as needed for Cough    GUAIFENESIN (MUCINEX) 600 MG EXTENDED RELEASE TABLET    Take 1 tablet by mouth 2 times daily for 15 days    OXYMETAZOLINE (12 HOUR NASAL SPRAY) 0.05 % NASAL SPRAY    2 sprays by Nasal route 2 times daily -- Do not use for more than 3

## 2024-10-25 LAB
EKG ATRIAL RATE: 91 BPM
EKG DIAGNOSIS: NORMAL
EKG P AXIS: 70 DEGREES
EKG P-R INTERVAL: 180 MS
EKG Q-T INTERVAL: 358 MS
EKG QRS DURATION: 86 MS
EKG QTC CALCULATION (BAZETT): 440 MS
EKG R AXIS: 64 DEGREES
EKG T AXIS: 73 DEGREES
EKG VENTRICULAR RATE: 91 BPM

## 2024-10-25 PROCEDURE — 93010 ELECTROCARDIOGRAM REPORT: CPT | Performed by: INTERNAL MEDICINE

## 2025-01-07 ENCOUNTER — OFFICE VISIT (OUTPATIENT)
Age: 70
End: 2025-01-07
Payer: MEDICAID

## 2025-01-07 VITALS
BODY MASS INDEX: 21.57 KG/M2 | DIASTOLIC BLOOD PRESSURE: 60 MMHG | HEART RATE: 89 BPM | WEIGHT: 117.2 LBS | HEIGHT: 62 IN | OXYGEN SATURATION: 97 % | SYSTOLIC BLOOD PRESSURE: 120 MMHG

## 2025-01-07 DIAGNOSIS — I10 ESSENTIAL (PRIMARY) HYPERTENSION: Primary | ICD-10-CM

## 2025-01-07 DIAGNOSIS — G20.A1 PARKINSON'S DISEASE, UNSPECIFIED WHETHER DYSKINESIA PRESENT, UNSPECIFIED WHETHER MANIFESTATIONS FLUCTUATE (HCC): ICD-10-CM

## 2025-01-07 DIAGNOSIS — R00.2 PALPITATIONS: ICD-10-CM

## 2025-01-07 PROCEDURE — 99214 OFFICE O/P EST MOD 30 MIN: CPT | Performed by: SPECIALIST

## 2025-01-07 PROCEDURE — 3078F DIAST BP <80 MM HG: CPT | Performed by: SPECIALIST

## 2025-01-07 PROCEDURE — 1123F ACP DISCUSS/DSCN MKR DOCD: CPT | Performed by: SPECIALIST

## 2025-01-07 PROCEDURE — 3074F SYST BP LT 130 MM HG: CPT | Performed by: SPECIALIST

## 2025-01-07 RX ORDER — LISINOPRIL 2.5 MG/1
2.5 TABLET ORAL DAILY
Qty: 90 TABLET | Refills: 3 | Status: SHIPPED | OUTPATIENT
Start: 2025-01-07

## 2025-01-07 RX ORDER — BISACODYL 5 MG/1
5 TABLET, DELAYED RELEASE ORAL DAILY PRN
COMMUNITY

## 2025-01-07 RX ORDER — CARBIDOPA AND LEVODOPA 35; 140 MG/1; MG/1
1 CAPSULE, EXTENDED RELEASE ORAL DAILY
COMMUNITY

## 2025-01-07 NOTE — PROGRESS NOTES
Chief Complaint   Patient presents with    6 Month Follow-Up     There were no vitals filed for this visit.  Reports when feeling anxious she does get pressure in her chest and SOB  Chest pain NO     ER, urgent care, or hospitalized outside of [facility] since your last visit?  NO     Refills NO

## 2025-01-07 NOTE — PROGRESS NOTES
Eugenio Wells MD. [facility]          Patient: Palma Domínguez  : 1955      Today's Date: 2025        HISTORY OF PRESENT ILLNESS:     History of Present Illness:  Has parkinson's.  Has some chronic palpitations, random chest pain, back pain, etc ---> feeling better with Parkinson's meds.   BP low in AM so she takes amlodipine as needed.         PAST MEDICAL HISTORY:     Past Medical History:   Diagnosis Date    Adverse effect of anesthesia     During open dental work-local anesthesia- increase in heart rate     Blindness     retina issues- legally blind (Bilat. eyes)    Blurred vision     Constipation     Hair loss     Heart murmur     Hypertension     Parkinson disease (HCC)        Past Surgical History:   Procedure Laterality Date    CATARACT REMOVAL Bilateral     COLONOSCOPY      COLONOSCOPY N/A 2018    COLONOSCOPY performed by Farzad Tanner MD at [facility] ENDOSCOPY    HYSTERECTOMY (CERVIX STATUS UNKNOWN)      Not sure if she has her ovaries, patient states    OTHER SURGICAL HISTORY Bilateral     eyes    OTHER SURGICAL HISTORY      tubiligation             CURRENT MEDICATIONS:    .  Current Outpatient Medications   Medication Sig Dispense Refill    bisacodyl (LAXATIVE) 5 MG EC tablet Take 1 tablet by mouth daily as needed for Constipation      Carbidopa-Levodopa ER (CREXONT)  MG CPCR Take 1 tablet by mouth daily      lisinopril (PRINIVIL;ZESTRIL) 2.5 MG tablet Take 1 tablet by mouth daily 90 tablet 3    acetaminophen (TYLENOL) 500 MG tablet Take 1 tablet by mouth 4 times daily as needed for Pain (Patient not taking: Reported on 2025) 360 tablet 0     No current facility-administered medications for this visit.       No Known Allergies      SOCIAL HISTORY:     Social History     Tobacco Use    Smoking status: Never    Smokeless tobacco: Never   Substance Use Topics    Alcohol use: Yes     Alcohol/week: 1.0 standard drink of alcohol    Drug use: No         FAMILY HISTORY:     Family

## (undated) DEVICE — SIMPLICITY FLUFF UNDERPAD 23X36, MODERATE: Brand: SIMPLICITY

## (undated) DEVICE — CONTAINER SPEC 20 ML LID NEUT BUFF FORMALIN 10 % POLYPR STS

## (undated) DEVICE — Device

## (undated) DEVICE — KIT COLON W/ 1.1OZ LUB AND 2 END

## (undated) DEVICE — SNARE ENDOSCP M L240CM W27MM SHTH DIA2.4MM CHN 2.8MM OVL

## (undated) DEVICE — ADULT SPO2 SENSOR: Brand: NELLCOR

## (undated) DEVICE — 3M™ CUROS™ DISINFECTING CAP FOR NEEDLELESS CONNECTORS 270/CARTON 20 CARTONS/CASE CFF1-270: Brand: CUROS™

## (undated) DEVICE — SET GRAV CK VLV NEEDLESS ST 3 GANGED 4WAY STPCOCK HI FLO 10

## (undated) DEVICE — BAG SPEC BIOHZRD 10 X 10 IN --

## (undated) DEVICE — BAG BELONG PT PERS CLEAR HANDL

## (undated) DEVICE — POLYP TRAP: Brand: TRAPEASE®

## (undated) DEVICE — CATH IV AUTOGRD BC BLU 22GA 25 -- INSYTE

## (undated) DEVICE — 1200 GUARD II KIT W/5MM TUBE W/O VAC TUBE: Brand: GUARDIAN

## (undated) DEVICE — SOLIDIFIER MEDC 1200ML -- CONVERT TO 356117

## (undated) DEVICE — CANN NASAL O2 CAPNOGRAPHY AD -- FILTERLINE

## (undated) DEVICE — KENDALL RADIOLUCENT FOAM MONITORING ELECTRODE -RECTANGULAR SHAPE: Brand: KENDALL